# Patient Record
Sex: MALE | Race: WHITE | NOT HISPANIC OR LATINO | Employment: OTHER | URBAN - METROPOLITAN AREA
[De-identification: names, ages, dates, MRNs, and addresses within clinical notes are randomized per-mention and may not be internally consistent; named-entity substitution may affect disease eponyms.]

---

## 2019-10-18 NOTE — PATIENT INSTRUCTIONS
Preventive Health Recommendations  Male Ages 40 to 49    Yearly exam:             See your health care provider every year in order to  o   Review health changes.   o   Discuss preventive care.    o   Review your medicines if your doctor has prescribed any.    You should be tested each year for STDs (sexually transmitted diseases) if you re at risk.     Have a cholesterol test every 5 years.     Have a colonoscopy (test for colon cancer) if someone in your family has had colon cancer or polyps before age 50.     After age 45, have a diabetes test (fasting glucose). If you are at risk for diabetes, you should have this test every 3 years.      Talk with your health care provider about whether or not a prostate cancer screening test (PSA) is right for you.    Shots: Get a flu shot each year. Get a tetanus shot every 10 years.     Nutrition:    Eat at least 5 servings of fruits and vegetables daily.     Eat whole-grain bread, whole-wheat pasta and brown rice instead of white grains and rice.     Get adequate Calcium and Vitamin D.     Lifestyle    Exercise for at least 150 minutes a week (30 minutes a day, 5 days a week). This will help you control your weight and prevent disease.     Limit alcohol to one drink per day.     No smoking.     Wear sunscreen to prevent skin cancer.     See your dentist every six months for an exam and cleaning.    Given your elevated blood pressure, I recommend a medication called lisinopril 5 mg daily with breakfast.  If you have side effects, let me know.  Follow up every 2 weeks for a blood pressure check and will recheck labs in 1 month.    Will order a sleep study as I have a high suspicion for sleep apnea given your snoring and difficulty sleeping.  They will call to schedule.    Try melatonin 3 mg nightly for sleep.

## 2019-10-18 NOTE — PROGRESS NOTES
SUBJECTIVE:   CC: Alexey Underwood is an 43 year old male who presents for preventative health visit.     Healthy Habits:     Getting at least 3 servings of Calcium per day:  Yes    Bi-annual eye exam:  Yes    Dental care twice a year:  Yes    Sleep apnea or symptoms of sleep apnea:  Excessive snoring    Diet:  Regular (no restrictions)    Frequency of exercise:  None    Taking medications regularly:  Yes    Medication side effects:  None    PHQ-2 Total Score: 1    Additional concerns today:  No    He has a history of borderline elevated blood pressure over the years but over the past few months it has been quite high when checking it outside of clinic. He has cut out pop over the past 3 years to try to be more healthy but still eats large portion sizes and does not get routine exercise. He has been under more stress recently as his wife was recently diagnosed with lymphoma so she has been undergoing chemotherapy treatment. He also has been stressed at work as there are no roads being built near his place of employment so they may need to move their building. He has been having difficulty sleeping at night given all the stress and also snores heavily. His wife has never told him that he stops breathing but there is a family history of sleep apnea in his father.     He had an episode of testicular pain/swelling a few weeks ago but it only lasted 1 day and symptoms have not returned.    Today's PHQ-2 Score:   PHQ-2 ( 1999 [employer]) 10/21/2019   Q1: Little interest or pleasure in doing things 0   Q2: Feeling down, depressed or hopeless 1   PHQ-2 Score 1   Q1: Little interest or pleasure in doing things Not at all   Q2: Feeling down, depressed or hopeless Several days   PHQ-2 Score 1     Abuse: Current or Past(Physical, Sexual or Emotional)- No  Do you feel safe in your environment? Yes    Social History     Tobacco Use     Smoking status: Never Smoker     Smokeless tobacco: Never Used   Substance Use Topics     Alcohol  "use: Yes     Comment: 1-2 times/month     If you drink alcohol do you typically have >3 drinks per day or >7 drinks per week? No    Alcohol Use 10/21/2019   Prescreen: >3 drinks/day or >7 drinks/week? No   Prescreen: >3 drinks/day or >7 drinks/week? -       Last PSA: No results found for: PSA    Reviewed orders with patient. Reviewed health maintenance and updated orders accordingly - Yes    Reviewed and updated as needed this visit by clinical staff  Tobacco  Allergies  Meds  Problems  Med Hx  Surg Hx  Fam Hx  Soc Hx          Reviewed and updated as needed this visit by Provider  Tobacco  Allergies  Meds  Problems  Med Hx  Surg Hx  Fam Hx      Review of Systems   Constitutional: Negative for chills and fever.   HENT: Negative for congestion, ear pain, hearing loss and sore throat.    Eyes: Negative for pain and visual disturbance.   Respiratory: Negative for cough and shortness of breath.    Cardiovascular: Negative for chest pain, palpitations and peripheral edema.   Gastrointestinal: Negative for abdominal pain, constipation, diarrhea, heartburn, hematochezia and nausea.   Genitourinary: Negative for discharge, dysuria, frequency, genital sores, hematuria, impotence and urgency.   Musculoskeletal: Negative for arthralgias, joint swelling and myalgias.   Skin: Negative for rash.   Neurological: Negative for dizziness, weakness, headaches and paresthesias.   Psychiatric/Behavioral: Negative for mood changes. The patient is nervous/anxious.      OBJECTIVE:   BP (!) 156/98   Pulse 77   Temp 98.7  F (37.1  C) (Temporal)   Resp 18   Ht 1.727 m (5' 8\")   Wt 122.5 kg (270 lb)   SpO2 96%   BMI 41.05 kg/m      Physical Exam  GENERAL: healthy, alert and no distress  EYES: Eyes grossly normal to inspection, PERRL and conjunctivae and sclerae normal  HENT: ear canals and TM's normal, nose and mouth without ulcers or lesions  NECK: no adenopathy, no asymmetry, masses, or scars and thyroid normal to " palpation  RESP: lungs clear to auscultation - no rales, rhonchi or wheezes  CV: regular rate and rhythm, normal S1 S2, no S3 or S4, no murmur, click or rub, no peripheral edema and peripheral pulses strong  ABDOMEN: soft, nontender, no hepatosplenomegaly, no masses and bowel sounds normal   (male): normal male circumcised genitalia without lesions or urethral discharge, no hernia  MS: no gross musculoskeletal defects noted, no edema. FROM to all extremities. No spinal tenderness.   SKIN: no suspicious lesions or rashes  NEURO: Normal strength and tone, mentation intact and speech normal. Cranial nerves II-XII are grossly intact. DTRs are 2+/4 throughout and symmetric. Gait is stable.  PSYCH: mentation appears normal, affect normal/bright    ASSESSMENT/PLAN:       ICD-10-CM    1. Routine general medical examination at a health care facility Z00.00    2. Essential hypertension I10 Basic metabolic panel  (Ca, Cl, CO2, Creat, Gluc, K, Na, BUN)     TSH with free T4 reflex     lisinopril (PRINIVIL/ZESTRIL) 5 MG tablet     Basic metabolic panel  (Ca, Cl, CO2, Creat, Gluc, K, Na, BUN)     OFFICE/OUTPT VISIT,EST,LEVL III   3. Morbid obesity (H) E66.01 OFFICE/OUTPT VISIT,EST,LEVL III   4. Snoring R06.83 SLEEP EVALUATION & MANAGEMENT REFERRAL - [facility] [phone] (Age 13 and up if over 100 lbs)     OFFICE/OUTPT VISIT,EST,LEVL III   5. CARDIOVASCULAR SCREENING; LDL GOAL LESS THAN 160 Z13.6 Lipid panel reflex to direct LDL Fasting       2-3. He has a history of elevated blood pressure over the years and it has been elevated recently outside of the clinic per patient. Given these readings including today's readings which are quite high, he has hypertension so I recommend lisinopril 5 mg daily. I discussed common side effects and recommend nursing blood pressure checks every 2 weeks for the next month. Will check BMP and TSH today and will recheck BMP in 1 month. I also recommend a low salt,  "low carb diet with decreased portion sizes along with routine exercise for at least 30 minutes 3-5 days per week.     BP Readings from Last 6 Encounters:   10/21/19 (!) 156/98   12/05/14 (!) 148/98   01/08/14 130/70   12/19/13 128/89   10/10/13 138/88   10/28/11 136/78     4. Given his difficulty sleeping, obesity, and snoring, I have a high suspicion for sleep apnea so will order a sleep study for further evaluation. I recommend melatonin 3 mg nightly as needed.    5. Fasting lipid panel ordered.    Follow up annually.       COUNSELING:   Reviewed preventive health counseling, as reflected in patient instructions       Regular exercise       Healthy diet/nutrition    Estimated body mass index is 41.05 kg/m  as calculated from the following:    Height as of this encounter: 1.727 m (5' 8\").    Weight as of this encounter: 122.5 kg (270 lb).     Weight management plan: Discussed healthy diet and exercise guidelines     reports that he has never smoked. He has never used smokeless tobacco.      Counseling Resources:  ATP IV Guidelines  Pooled Cohorts Equation Calculator  FRAX Risk Assessment  ICSI Preventive Guidelines  Dietary Guidelines for Americans, 2010  USDA's MyPlate  ASA Prophylaxis  Lung CA Screening    Carlos Enrique Culver PA-C  [facility]  "

## 2019-10-21 ENCOUNTER — OFFICE VISIT (OUTPATIENT)
Dept: FAMILY MEDICINE | Facility: OTHER | Age: 44
End: 2019-10-21
Payer: COMMERCIAL

## 2019-10-21 VITALS
SYSTOLIC BLOOD PRESSURE: 148 MMHG | RESPIRATION RATE: 18 BRPM | WEIGHT: 270 LBS | DIASTOLIC BLOOD PRESSURE: 88 MMHG | BODY MASS INDEX: 40.92 KG/M2 | HEIGHT: 68 IN | TEMPERATURE: 98.7 F | HEART RATE: 77 BPM | OXYGEN SATURATION: 96 %

## 2019-10-21 DIAGNOSIS — Z00.00 ROUTINE GENERAL MEDICAL EXAMINATION AT A HEALTH CARE FACILITY: Primary | ICD-10-CM

## 2019-10-21 DIAGNOSIS — E66.01 MORBID OBESITY (H): ICD-10-CM

## 2019-10-21 DIAGNOSIS — Z13.6 CARDIOVASCULAR SCREENING; LDL GOAL LESS THAN 160: ICD-10-CM

## 2019-10-21 DIAGNOSIS — R06.83 SNORING: ICD-10-CM

## 2019-10-21 DIAGNOSIS — I10 ESSENTIAL HYPERTENSION: ICD-10-CM

## 2019-10-21 LAB
ANION GAP SERPL CALCULATED.3IONS-SCNC: 4 MMOL/L (ref 3–14)
BUN SERPL-MCNC: 15 MG/DL (ref 7–30)
CALCIUM SERPL-MCNC: 9.1 MG/DL (ref 8.5–10.1)
CHLORIDE SERPL-SCNC: 106 MMOL/L (ref 94–109)
CHOLEST SERPL-MCNC: 171 MG/DL
CO2 SERPL-SCNC: 28 MMOL/L (ref 20–32)
CREAT SERPL-MCNC: 0.81 MG/DL (ref 0.66–1.25)
GFR SERPL CREATININE-BSD FRML MDRD: >90 ML/MIN/{1.73_M2}
GLUCOSE SERPL-MCNC: 101 MG/DL (ref 70–99)
HDLC SERPL-MCNC: 48 MG/DL
LDLC SERPL CALC-MCNC: 105 MG/DL
NONHDLC SERPL-MCNC: 123 MG/DL
POTASSIUM SERPL-SCNC: 3.6 MMOL/L (ref 3.4–5.3)
SODIUM SERPL-SCNC: 138 MMOL/L (ref 133–144)
TRIGL SERPL-MCNC: 88 MG/DL
TSH SERPL DL<=0.005 MIU/L-ACNC: 1.22 MU/L (ref 0.4–4)

## 2019-10-21 PROCEDURE — 99213 OFFICE O/P EST LOW 20 MIN: CPT | Mod: 25 | Performed by: PHYSICIAN ASSISTANT

## 2019-10-21 PROCEDURE — 80061 LIPID PANEL: CPT | Performed by: PHYSICIAN ASSISTANT

## 2019-10-21 PROCEDURE — 99386 PREV VISIT NEW AGE 40-64: CPT | Performed by: PHYSICIAN ASSISTANT

## 2019-10-21 PROCEDURE — 80048 BASIC METABOLIC PNL TOTAL CA: CPT | Performed by: PHYSICIAN ASSISTANT

## 2019-10-21 PROCEDURE — 36415 COLL VENOUS BLD VENIPUNCTURE: CPT | Performed by: PHYSICIAN ASSISTANT

## 2019-10-21 PROCEDURE — 84443 ASSAY THYROID STIM HORMONE: CPT | Performed by: PHYSICIAN ASSISTANT

## 2019-10-21 RX ORDER — LISINOPRIL 5 MG/1
5 TABLET ORAL DAILY
Qty: 30 TABLET | Refills: 5 | Status: SHIPPED | OUTPATIENT
Start: 2019-10-21 | End: 2020-04-08

## 2019-10-21 ASSESSMENT — ENCOUNTER SYMPTOMS
EYE PAIN: 0
ABDOMINAL PAIN: 0
CONSTIPATION: 0
CHILLS: 0
DIARRHEA: 0
HEARTBURN: 0
HEMATOCHEZIA: 0
SORE THROAT: 0
PALPITATIONS: 0
NAUSEA: 0
WEAKNESS: 0
SHORTNESS OF BREATH: 0
COUGH: 0
HEADACHES: 0
MYALGIAS: 0
JOINT SWELLING: 0
PARESTHESIAS: 0
FEVER: 0
HEMATURIA: 0
FREQUENCY: 0
DIZZINESS: 0
ARTHRALGIAS: 0
DYSURIA: 0
NERVOUS/ANXIOUS: 1

## 2019-10-21 ASSESSMENT — MIFFLIN-ST. JEOR: SCORE: 2104.21

## 2019-11-11 ENCOUNTER — ALLIED HEALTH/NURSE VISIT (OUTPATIENT)
Dept: FAMILY MEDICINE | Facility: OTHER | Age: 44
End: 2019-11-11
Payer: COMMERCIAL

## 2019-11-11 VITALS — DIASTOLIC BLOOD PRESSURE: 91 MMHG | SYSTOLIC BLOOD PRESSURE: 139 MMHG | HEART RATE: 79 BPM

## 2019-11-11 DIAGNOSIS — Z01.30 BP CHECK: Primary | ICD-10-CM

## 2019-11-11 PROCEDURE — 99207 ZZC NO CHARGE NURSE ONLY: CPT

## 2019-11-11 NOTE — PROGRESS NOTES
Alexey Underwood is a 44 year old patient who comes in today for a Blood Pressure check.  Initial BP:  BP (!) 139/91   Pulse 79      79  Disposition: follow-up as previously indicated by provider and results routed to provider  Camilla Ballard CMA

## 2019-11-14 PROBLEM — E66.01 MORBID OBESITY (H): Chronic | Status: ACTIVE | Noted: 2019-10-21

## 2019-11-14 PROBLEM — I10 ESSENTIAL HYPERTENSION: Chronic | Status: ACTIVE | Noted: 2019-10-21

## 2019-11-17 ENCOUNTER — HOSPITAL ENCOUNTER (EMERGENCY)
Facility: CLINIC | Age: 44
Discharge: HOME | End: 2019-11-18
Attending: PHYSICIAN ASSISTANT | Admitting: PHYSICIAN ASSISTANT
Payer: COMMERCIAL

## 2019-11-17 DIAGNOSIS — R11.10 VOMITING AND DIARRHEA: ICD-10-CM

## 2019-11-17 DIAGNOSIS — R19.7 VOMITING AND DIARRHEA: ICD-10-CM

## 2019-11-17 LAB
ALBUMIN SERPL-MCNC: 3.7 G/DL (ref 3.4–5)
ALP SERPL-CCNC: 100 U/L (ref 40–150)
ALT SERPL W P-5'-P-CCNC: 88 U/L (ref 0–70)
ANION GAP SERPL CALCULATED.3IONS-SCNC: 9 MMOL/L (ref 3–14)
AST SERPL W P-5'-P-CCNC: 23 U/L (ref 0–45)
BASOPHILS # BLD AUTO: 0 10E9/L (ref 0–0.2)
BASOPHILS NFR BLD AUTO: 0.2 %
BILIRUB SERPL-MCNC: 0.6 MG/DL (ref 0.2–1.3)
BUN SERPL-MCNC: 27 MG/DL (ref 7–30)
CALCIUM SERPL-MCNC: 8.6 MG/DL (ref 8.5–10.1)
CHLORIDE SERPL-SCNC: 105 MMOL/L (ref 94–109)
CO2 SERPL-SCNC: 26 MMOL/L (ref 20–32)
CREAT SERPL-MCNC: 0.82 MG/DL (ref 0.66–1.25)
DIFFERENTIAL METHOD BLD: ABNORMAL
EOSINOPHIL NFR BLD AUTO: 0 %
ERYTHROCYTE [DISTWIDTH] IN BLOOD BY AUTOMATED COUNT: 14.3 % (ref 10–15)
GFR SERPL CREATININE-BSD FRML MDRD: >90 ML/MIN/{1.73_M2}
GLUCOSE SERPL-MCNC: 131 MG/DL (ref 70–99)
HCT VFR BLD AUTO: 50.2 % (ref 40–53)
HGB BLD-MCNC: 16.6 G/DL (ref 13.3–17.7)
IMM GRANULOCYTES # BLD: 0 10E9/L (ref 0–0.4)
IMM GRANULOCYTES NFR BLD: 0.4 %
LIPASE SERPL-CCNC: 35 U/L (ref 73–393)
LYMPHOCYTES # BLD AUTO: 0.4 10E9/L (ref 0.8–5.3)
LYMPHOCYTES NFR BLD AUTO: 4.3 %
MCH RBC QN AUTO: 28.9 PG (ref 26.5–33)
MCHC RBC AUTO-ENTMCNC: 33.1 G/DL (ref 31.5–36.5)
MCV RBC AUTO: 88 FL (ref 78–100)
MONOCYTES # BLD AUTO: 0.5 10E9/L (ref 0–1.3)
MONOCYTES NFR BLD AUTO: 4.4 %
NEUTROPHILS # BLD AUTO: 9.2 10E9/L (ref 1.6–8.3)
NEUTROPHILS NFR BLD AUTO: 90.7 %
NRBC # BLD AUTO: 0 10*3/UL
NRBC BLD AUTO-RTO: 0 /100
PLATELET # BLD AUTO: 252 10E9/L (ref 150–450)
POTASSIUM SERPL-SCNC: 3.9 MMOL/L (ref 3.4–5.3)
PROT SERPL-MCNC: 8.1 G/DL (ref 6.8–8.8)
RBC # BLD AUTO: 5.74 10E12/L (ref 4.4–5.9)
SODIUM SERPL-SCNC: 140 MMOL/L (ref 133–144)
TROPONIN I SERPL-MCNC: <0.015 UG/L (ref 0–0.04)
WBC # BLD AUTO: 10.2 10E9/L (ref 4–11)

## 2019-11-17 PROCEDURE — 25000128 H RX IP 250 OP 636: Performed by: PHYSICIAN ASSISTANT

## 2019-11-17 PROCEDURE — 96365 THER/PROPH/DIAG IV INF INIT: CPT | Performed by: PHYSICIAN ASSISTANT

## 2019-11-17 PROCEDURE — 85025 COMPLETE CBC W/AUTO DIFF WBC: CPT | Performed by: PHYSICIAN ASSISTANT

## 2019-11-17 PROCEDURE — 25800030 ZZH RX IP 258 OP 636: Performed by: PHYSICIAN ASSISTANT

## 2019-11-17 PROCEDURE — 96374 THER/PROPH/DIAG INJ IV PUSH: CPT | Performed by: PHYSICIAN ASSISTANT

## 2019-11-17 PROCEDURE — 84484 ASSAY OF TROPONIN QUANT: CPT | Performed by: PHYSICIAN ASSISTANT

## 2019-11-17 PROCEDURE — 93005 ELECTROCARDIOGRAM TRACING: CPT | Performed by: PHYSICIAN ASSISTANT

## 2019-11-17 PROCEDURE — 99284 EMERGENCY DEPT VISIT MOD MDM: CPT | Mod: 25 | Performed by: PHYSICIAN ASSISTANT

## 2019-11-17 PROCEDURE — 83690 ASSAY OF LIPASE: CPT | Performed by: PHYSICIAN ASSISTANT

## 2019-11-17 PROCEDURE — 80053 COMPREHEN METABOLIC PANEL: CPT | Performed by: PHYSICIAN ASSISTANT

## 2019-11-17 PROCEDURE — 99284 EMERGENCY DEPT VISIT MOD MDM: CPT | Mod: 26 | Performed by: PHYSICIAN ASSISTANT

## 2019-11-17 RX ORDER — MELATONIN 10 MG
CAPSULE ORAL PRN
COMMUNITY
End: 2024-02-27

## 2019-11-17 RX ORDER — HYDROMORPHONE HYDROCHLORIDE 1 MG/ML
0.5 INJECTION, SOLUTION INTRAMUSCULAR; INTRAVENOUS; SUBCUTANEOUS
Status: DISCONTINUED | OUTPATIENT
Start: 2019-11-17 | End: 2019-11-18 | Stop reason: HOSPADM

## 2019-11-17 RX ORDER — SODIUM CHLORIDE 9 MG/ML
INJECTION, SOLUTION INTRAVENOUS CONTINUOUS
Status: DISCONTINUED | OUTPATIENT
Start: 2019-11-17 | End: 2019-11-18 | Stop reason: HOSPADM

## 2019-11-17 RX ORDER — ONDANSETRON 2 MG/ML
4 INJECTION INTRAMUSCULAR; INTRAVENOUS EVERY 30 MIN PRN
Status: DISCONTINUED | OUTPATIENT
Start: 2019-11-17 | End: 2019-11-18 | Stop reason: HOSPADM

## 2019-11-17 RX ADMIN — ONDANSETRON 4 MG: 2 INJECTION INTRAMUSCULAR; INTRAVENOUS at 22:08

## 2019-11-17 RX ADMIN — SODIUM CHLORIDE 1000 ML: 9 INJECTION, SOLUTION INTRAVENOUS at 21:59

## 2019-11-17 RX ADMIN — SODIUM CHLORIDE 1000 ML: 9 INJECTION, SOLUTION INTRAVENOUS at 22:49

## 2019-11-17 NOTE — ED AVS SNAPSHOT
[facility] Emergency Department  [address] [place] [address]  Phone:  [phone]  Fax:  [phone]                                    Alexey Underwood   MRN: [id number]    Department:  [facility] Emergency Department   Date of Visit:  11/17/2019           After Visit Summary Signature Page    I have received my discharge instructions, and my questions have been answered. I have discussed any challenges I see with this plan with the nurse or doctor.    ..........................................................................................................................................  Patient/Patient Representative Signature      ..........................................................................................................................................  Patient Representative Print Name and Relationship to Patient    ..................................................               ................................................  Date                                   Time    ..........................................................................................................................................  Reviewed by Signature/Title    ...................................................              ..............................................  Date                                               Time          22EPIC Rev 08/18

## 2019-11-18 VITALS
TEMPERATURE: 97.8 F | RESPIRATION RATE: 20 BRPM | OXYGEN SATURATION: 93 % | SYSTOLIC BLOOD PRESSURE: 119 MMHG | DIASTOLIC BLOOD PRESSURE: 60 MMHG | BODY MASS INDEX: 41.05 KG/M2 | HEART RATE: 101 BPM | WEIGHT: 270 LBS

## 2019-11-18 RX ORDER — ONDANSETRON 4 MG/1
4 TABLET, ORALLY DISINTEGRATING ORAL EVERY 8 HOURS PRN
Qty: 10 TABLET | Refills: 0 | Status: SHIPPED | OUTPATIENT
Start: 2019-11-18 | End: 2020-05-05

## 2019-11-18 NOTE — ED TRIAGE NOTES
"Presents to ED with nausea, vomiting, and diarrhea since this morning. States, \"I ate some sausage that may have been bad in my dad's fridge.\"  Patient reports he does have a history of diverticulitis but \"is well controlled due to the fiber supplements.\" States ate breakfast at 0530, went to the deer stand and started vomiting at 0730 and has been sick since. Also complains of mid-sternal chest pain that he describes as a \"heartburn\" and 6/10 pain that started after he became ill and was walking back from his deer stand.  "

## 2019-11-18 NOTE — DISCHARGE INSTRUCTIONS
It was a pleasure working with you today!  I hope your condition improves rapidly!    Please rest your stomach.  Stay on mostly a clear liquid diet until tomorrow afternoon.  Please see the handout on this.  Once her stomach is feeling much better, then you could try advancing your diet to the BRAT diet (banana, rice, applesauce, toast)    Make sure you are drinking a lot of water and/or Gatorade to maintain adequate hydration.  It is okay to use Zofran as needed for nausea and vomiting.    Please do not hesitate to return if your symptoms are worsening in any way or if you develop black or bloody stools like we discussed.

## 2019-11-18 NOTE — ED PROVIDER NOTES
History     Chief Complaint   Patient presents with     Nausea, Vomiting, & Diarrhea     The history is provided by the patient.     Alexey Underwood is a 44 year old male who is presenting to the emergency department with complaints of nausea, vomiting, and diarrhea. The patient states that this morning he woke up around 0530 to go hunting. He ate breakfast and then walked quite a ways to his deer stand where he climbed in and says he was able to get everything, like the heater, running. Around 0730 he started to vomit multiple times and had to go to the bathroom, so he headed back inside. When he came back in he had severe diarrhea and ended up vomiting multiple more times. The patient decided to head home and notes he had to stop multiple times on the way home to vomit as well as go to the bathroom. He is unable to keep anything down and says that within about four minutes of eating or drinking anything it comes back up or causes him to have diarrhea. He got home and took a shower which he says felt good. The patient has some abdominal pain that he rates a 6/10, but says it goes down to about a 4/10 when laying down. He notes that laying on his sides makes him nauseous, but laying flat he is fine. He has been vomiting acid now since he has not been able to keep anything down and notes his diarrhea is now darker, almost black, and has a foul smell. 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 He has no rashes or fever, but has had the chills and sweats. He denies being on any antibiotics recently. He says he drank some Gatorade about an hour ago. The patient has Diverticulitis and will occasionally get flare ups, but has not for awhile. He claims that this pain and symptoms does not feel similar to those flare up episodes. He has never had a GI bleed.     Allergies:  Allergies   Allergen Reactions     No Known Allergies        Problem List:    Patient  "Active Problem List    Diagnosis Date Noted     Morbid obesity (H) 10/21/2019     Priority: Medium     Essential hypertension 10/21/2019     Priority: Medium     Renal colic 12/19/2013     Priority: Medium     Left       Diverticulosis of colon- has had a few flares of \"-itis\" 10/28/2011     Priority: Medium     CARDIOVASCULAR SCREENING; LDL GOAL LESS THAN 160 10/31/2010     Priority: Medium        Past Medical History:    Past Medical History:   Diagnosis Date     Diverticulosis        Past Surgical History:    Past Surgical History:   Procedure Laterality Date     NO HISTORY OF SURGERY         Family History:    Family History   Problem Relation Age of Onset     Hypertension Father      Diabetes Maternal Grandmother      Diabetes Maternal Grandfather      Asthma No family hx of      C.A.D. No family hx of      Cerebrovascular Disease No family hx of      Breast Cancer No family hx of      Cancer - colorectal No family hx of      Prostate Cancer No family hx of      Alcohol/Drug No family hx of      Allergies No family hx of      Alzheimer Disease No family hx of      Anesthesia Reaction No family hx of      Arthritis No family hx of      Blood Disease No family hx of      Cancer No family hx of      Cardiovascular No family hx of      Circulatory No family hx of      Connective Tissue Disorder No family hx of      Congenital Anomalies No family hx of      Depression No family hx of      Eye Disorder No family hx of      Genetic Disorder No family hx of      Gastrointestinal Disease No family hx of      Genitourinary Problems No family hx of      Gynecology No family hx of      Heart Disease No family hx of      Lipids No family hx of      Musculoskeletal Disorder No family hx of      Neurologic Disorder No family hx of      Obesity No family hx of      Osteoporosis No family hx of      Psychotic Disorder No family hx of      Respiratory No family hx of      Thyroid Disease No family hx of      Hearing Loss No " family hx of        Social History:  Marital Status:   [2]  Social History     Tobacco Use     Smoking status: Never Smoker     Smokeless tobacco: Never Used   Substance Use Topics     Alcohol use: Yes     Comment: 1-2 times/month     Drug use: No        Medications:    FIBER PO  lisinopril (PRINIVIL/ZESTRIL) 5 MG tablet  Melatonin 10 MG CAPS  ondansetron (ZOFRAN ODT) 4 MG ODT tab          Review of Systems   All other systems reviewed and are negative.      Physical Exam   BP: (!) 148/95  Pulse: 113  Heart Rate: 98  Temp: 98.2  F (36.8  C)  Resp: 18  Weight: 122.5 kg (270 lb)  SpO2: 93 %      Physical Exam  Vitals signs and nursing note reviewed.   Constitutional:       General: He is not in acute distress.     Appearance: Normal appearance. He is not diaphoretic.   HENT:      Head: Normocephalic and atraumatic.      Right Ear: Tympanic membrane and external ear normal.      Left Ear: Tympanic membrane and external ear normal.      Nose: Nose normal. No congestion or rhinorrhea.      Mouth/Throat:      Mouth: Mucous membranes are dry.      Pharynx: Oropharynx is clear. No oropharyngeal exudate or posterior oropharyngeal erythema.   Eyes:      General: No scleral icterus.     Extraocular Movements: Extraocular movements intact.      Conjunctiva/sclera: Conjunctivae normal.      Pupils: Pupils are equal, round, and reactive to light.   Neck:      Musculoskeletal: Normal range of motion and neck supple. No neck rigidity or muscular tenderness.   Cardiovascular:      Rate and Rhythm: Normal rate and regular rhythm.      Pulses: Normal pulses.      Heart sounds: Normal heart sounds. No murmur.   Pulmonary:      Effort: Pulmonary effort is normal. No respiratory distress.      Breath sounds: Normal breath sounds. No wheezing.   Chest:      Chest wall: No tenderness.   Abdominal:      General: Bowel sounds are normal.      Palpations: Abdomen is soft.      Tenderness: There is abdominal tenderness in the epigastric  area.      Comments: Mild tenderness.    Musculoskeletal: Normal range of motion.         General: No tenderness, deformity or signs of injury.   Skin:     General: Skin is warm and dry.      Capillary Refill: Capillary refill takes less than 2 seconds.      Coloration: Skin is not pale.      Findings: No rash.   Neurological:      General: No focal deficit present.      Mental Status: He is alert and oriented to person, place, and time.      Cranial Nerves: No cranial nerve deficit.      Sensory: No sensory deficit.   Psychiatric:         Thought Content: Thought content normal.         Judgment: Judgment normal.         ED Course        Procedures               Critical Care time:  none               Results for orders placed or performed during the hospital encounter of 11/17/19 (from the past 24 hour(s))   CBC with platelets differential   Result Value Ref Range    WBC 10.2 4.0 - 11.0 10e9/L    RBC Count 5.74 4.4 - 5.9 10e12/L    Hemoglobin 16.6 13.3 - 17.7 g/dL    Hematocrit 50.2 40.0 - 53.0 %    MCV 88 78 - 100 fl    MCH 28.9 26.5 - 33.0 pg    MCHC 33.1 31.5 - 36.5 g/dL    RDW 14.3 10.0 - 15.0 %    Platelet Count 252 150 - 450 10e9/L    Diff Method Automated Method     % Neutrophils 90.7 %    % Lymphocytes 4.3 %    % Monocytes 4.4 %    % Eosinophils 0.0 %    % Basophils 0.2 %    % Immature Granulocytes 0.4 %    Nucleated RBCs 0 0 /100    Absolute Neutrophil 9.2 (H) 1.6 - 8.3 10e9/L    Absolute Lymphocytes 0.4 (L) 0.8 - 5.3 10e9/L    Absolute Monocytes 0.5 0.0 - 1.3 10e9/L    Absolute Basophils 0.0 0.0 - 0.2 10e9/L    Abs Immature Granulocytes 0.0 0 - 0.4 10e9/L    Absolute Nucleated RBC 0.0    Comprehensive metabolic panel   Result Value Ref Range    Sodium 140 133 - 144 mmol/L    Potassium 3.9 3.4 - 5.3 mmol/L    Chloride 105 94 - 109 mmol/L    Carbon Dioxide 26 20 - 32 mmol/L    Anion Gap 9 3 - 14 mmol/L    Glucose 131 (H) 70 - 99 mg/dL    Urea Nitrogen 27 7 - 30 mg/dL    Creatinine 0.82 0.66 - 1.25 mg/dL     GFR Estimate >90 >60 mL/min/[1.73_m2]    GFR Estimate If Black >90 >60 mL/min/[1.73_m2]    Calcium 8.6 8.5 - 10.1 mg/dL    Bilirubin Total 0.6 0.2 - 1.3 mg/dL    Albumin 3.7 3.4 - 5.0 g/dL    Protein Total 8.1 6.8 - 8.8 g/dL    Alkaline Phosphatase 100 40 - 150 U/L    ALT 88 (H) 0 - 70 U/L    AST 23 0 - 45 U/L   Lipase   Result Value Ref Range    Lipase 35 (L) 73 - 393 U/L   Troponin I   Result Value Ref Range    Troponin I ES <0.015 0.000 - 0.045 ug/L       Medications   0.9% sodium chloride BOLUS (0 mLs Intravenous Stopped 11/17/19 2248)     Followed by   0.9% sodium chloride BOLUS (0 mLs Intravenous Stopped 11/18/19 0011)     Followed by   sodium chloride 0.9% infusion (has no administration in time range)   ondansetron (ZOFRAN) injection 4 mg (4 mg Intravenous Given 11/17/19 2208)   HYDROmorphone (PF) (DILAUDID) injection 0.5 mg (has no administration in time range)       Assessments & Plan (with Medical Decision Making)  Vomiting and diarrhea     44 year old male presents for evaluation of acute onset nausea, vomiting, and diarrhea starting this morning at 7:30 AM.  Has not been able to tolerate anything p.o. since then.  Has immediate onset diarrhea within 4 minutes of eating or drinking anything.  No recent travel.  No other ill family members.  He is not sure if he ate something bad this morning with breakfast.  Denies any fevers or chills.  Has generalized abdominal discomfort, but no localized discomfort.  He has more discomfort in the epigastric area.  Denies any real chest pain.  No dyspnea.  No lower extremity edema.  On exam blood pressure 102/48, pulse 104, temperature 98.2, and oxygen saturation 92% on room air.  Morbidly obese male in no acute distress.  Very dry oral mucous membranes.  Generalized abdominal discomfort but no localized discomfort.  No rebound or guarding.  No hepatosplenomegaly.  On further questioning, the patient denies any prior history of GI bleeding.  No GI issues such as GERD,  gastritis, or other colonic abnormalities.  He states that his diarrhea was darker in color, but does not think that it was black.  I did want to perform a stool occult on his diarrhea given his frequent stools, but the patient did not go any more while he was in the ED.  I recommended a rectal exam, but he declined.  He was not concerned about GI bleeding.  He did not think that his stool looked black, and he did confirm that there was not any blood in his stool.  He was given IV normal saline 2 L for rehydration purposes.  IV Zofran provided for nausea.  He had no further vomiting here in the ED.  He states that his abdominal cramping discomfort completely resolved with these simple interventions as well as a dose of IV Dilaudid.  Patient drank 12 ounces of water here in the ED and tolerated this very well.  Given acute onset of symptoms this morning and nonhemorrhagic diarrhea, I am not planning to perform stool cultures currently.  Patient is aware that he should return in the event of symptoms worsening or changing.  He would like to return home if at all possible.  We discussed a clear liquid diet with him in detail and progress slowly with the BRAT diet tomorrow afternoon if he can tolerate it.  I did send him home with Zofran to use for as needed nausea and vomiting.  Patient likely has a case of viral enteritis.  He was in agreement with this plan and was suitable for discharge.  His daughter was present to drive him home.     I have reviewed the nursing notes.    I have reviewed the findings, diagnosis, plan and need for follow up with the patient.       New Prescriptions    ONDANSETRON (ZOFRAN ODT) 4 MG ODT TAB    Take 1 tablet (4 mg) by mouth every 8 hours as needed for nausea or vomiting       Final diagnoses:   Vomiting and diarrhea               This document serves as a record of services personally performed by Jameson Baker PA*. It was created on their behalf by Geneva Hansen, a trained  medical scribe. The creation of this record is based on the provider's personal observations and the statements of the patient. This document has been checked and approved by the attending provider.  Note: Chart documentation done in part with Dragon Voice Recognition software. Although reviewed after completion, some word and grammatical errors may remain.  11/17/2019   Jameson Baker PA-C   [facility] EMERGENCY DEPARTMENT     Jameson Baker PA-C  11/18/19 0037

## 2020-04-08 DIAGNOSIS — I10 ESSENTIAL HYPERTENSION: ICD-10-CM

## 2020-04-08 RX ORDER — LISINOPRIL 5 MG/1
5 TABLET ORAL DAILY
Qty: 90 TABLET | Refills: 1 | Status: SHIPPED | OUTPATIENT
Start: 2020-04-08 | End: 2020-10-06

## 2020-04-08 NOTE — TELEPHONE ENCOUNTER
Prescription approved per [facility] Refill Protocol.    Sangita Mcknight, RN, BSN

## 2020-04-29 NOTE — PROGRESS NOTES
"Alexey Underwood is a 44 year old adult who is being evaluated via a billable telephone visit.      The patient has been notified of following:     \"This telephone visit will be conducted via a call between you and your physician/provider. We have found that certain health care needs can be provided without the need for a physical exam.  This service lets us provide the care you need with a short phone conversation.  If a prescription is necessary we can send it directly to your pharmacy.  If lab work is needed we can place an order for that and you can then stop by our lab to have the test done at a later time.    Telephone visits are billed at different rates depending on your insurance coverage. During this emergency period, for some insurers they may be billed the same as an in-person visit.  Please reach out to your insurance provider with any questions.    If during the course of the call the physician/provider feels a telephone visit is not appropriate, you will not be charged for this service.\"    Patient has given verbal consent for Telephone visit?  Yes    How would you like to obtain your AVS? Alejandra  Patient presents for evaluation of loud snoring and possible witnessed apneas.  Mostly his snoring is been quite loud for many years.  Patient's wife is concerned as well as his family since there is strong family history of obstructive sleep apnea.  His father has AILEEN.  Patient denies gasping or choking but does produce unusual sound sometimes like he is wheezing according to his wife.  He usually goes to sleep around 1030 11:00 at night and falls asleep almost instantly in less than 5 minutes.  He usually wakes up maybe once a night to go to the bathroom quickly and falls asleep in 5 to 10 minutes.  Usually wakes up around 7 AM.  The weekends he keeps the same schedule.  He rarely requires alarm in the morning to get up.  He denies any history of nasal obstruction.  Denies any weakness parasomnias no " restless legs symptomatology.  He does prefer to sleep on his stomach and occasionally sides.  He does more at night.  As far sleep hygiene he does watch TV in bed which does not affect his ability to fall asleep.  He works during the day does not do shift work.  In regard to his excessive daytime sleepiness his [place] is only 6 which is not consistent with excessive daytime sleepiness.  In regard to his caffeine consumption he used to drink a lot of soda but now drinks occasionally iced in the morning before noon.  No other caffeine after that.  She has history of hypertension that is well-controlled with medications.  He is 5 foot 9 inches with 20 to 30 pounds weight therefore is morbidly obese.  He denies feeling sleepy or drowsy when he drives long distances.  Impression: Patient with snoring likely observed apneas family history of sleep apnea hypertension.  Certainly further evaluation is necessary for sleep disordered breathing.  We will therefore order comprehensive blood sonography to further evaluate.  Patient will be followed afterwards.  Phone call duration: 25 minutes    Antonio Her MD

## 2020-05-05 ENCOUNTER — VIRTUAL VISIT (OUTPATIENT)
Dept: SLEEP MEDICINE | Facility: CLINIC | Age: 45
End: 2020-05-05
Attending: PHYSICIAN ASSISTANT
Payer: COMMERCIAL

## 2020-05-05 DIAGNOSIS — R06.83 SNORING: ICD-10-CM

## 2020-05-05 DIAGNOSIS — R06.81 APNEA: Primary | ICD-10-CM

## 2020-05-05 PROCEDURE — 99203 OFFICE O/P NEW LOW 30 MIN: CPT | Mod: 95 | Performed by: OTOLARYNGOLOGY

## 2020-10-06 DIAGNOSIS — I10 ESSENTIAL HYPERTENSION: ICD-10-CM

## 2020-10-06 RX ORDER — LISINOPRIL 5 MG/1
TABLET ORAL
Qty: 90 TABLET | Refills: 0 | Status: SHIPPED | OUTPATIENT
Start: 2020-10-06 | End: 2020-10-14

## 2020-10-07 NOTE — TELEPHONE ENCOUNTER
Pending Prescriptions:                       Disp   Refills    lisinopril (ZESTRIL) 5 MG tablet [Pharmac*90 tab*0            Sig: TAKE 1 TABLET(5 MG) BY MOUTH DAILY    Medication is being filled for 1 time júnior refill only due to:  Patient is due for med check    Please call and help schedule.  Thank you!

## 2020-10-14 ENCOUNTER — OFFICE VISIT (OUTPATIENT)
Dept: FAMILY MEDICINE | Facility: OTHER | Age: 45
End: 2020-10-14
Payer: COMMERCIAL

## 2020-10-14 VITALS
TEMPERATURE: 97.6 F | OXYGEN SATURATION: 97 % | HEIGHT: 68 IN | BODY MASS INDEX: 42.51 KG/M2 | RESPIRATION RATE: 16 BRPM | SYSTOLIC BLOOD PRESSURE: 142 MMHG | WEIGHT: 280.5 LBS | DIASTOLIC BLOOD PRESSURE: 102 MMHG | HEART RATE: 74 BPM

## 2020-10-14 DIAGNOSIS — R06.83 SNORING: ICD-10-CM

## 2020-10-14 DIAGNOSIS — E66.01 MORBID OBESITY (H): ICD-10-CM

## 2020-10-14 DIAGNOSIS — Z23 NEED FOR PROPHYLACTIC VACCINATION AND INOCULATION AGAINST INFLUENZA: ICD-10-CM

## 2020-10-14 DIAGNOSIS — I10 ESSENTIAL HYPERTENSION: Primary | ICD-10-CM

## 2020-10-14 DIAGNOSIS — Z11.4 SCREENING FOR HIV (HUMAN IMMUNODEFICIENCY VIRUS): ICD-10-CM

## 2020-10-14 DIAGNOSIS — Z13.6 CARDIOVASCULAR SCREENING; LDL GOAL LESS THAN 160: ICD-10-CM

## 2020-10-14 PROCEDURE — 99214 OFFICE O/P EST MOD 30 MIN: CPT | Performed by: PHYSICIAN ASSISTANT

## 2020-10-14 RX ORDER — LISINOPRIL 10 MG/1
10 TABLET ORAL DAILY
Qty: 90 TABLET | Refills: 3 | Status: SHIPPED | OUTPATIENT
Start: 2020-10-14 | End: 2021-11-23

## 2020-10-14 ASSESSMENT — MIFFLIN-ST. JEOR: SCORE: 2139.22

## 2020-10-14 NOTE — PROGRESS NOTES
Subjective     Alexey Underwood is a 44 year old adult who presents to clinic today for the following health issues:    History of Present Illness       Hypertension: He presents for follow up of hypertension.  He does not check blood pressure  regularly outside of the clinic. Outside blood pressures have been over 140/90. He does not follow a low salt diet.     He eats 0-1 servings of fruits and vegetables daily.He consumes 1 sweetened beverage(s) daily.He exercises with enough effort to increase his heart rate 10 to 19 minutes per day.  He exercises with enough effort to increase his heart rate 7 days per week.   He is taking medications regularly.         He has not had the sleep study yet.  He did have a virtual visit with  From a [facility].  He states that he filled out all the information to have it done. But then COVID hit and he didn't hear anything.  He would like to schedule it now. Patient states that he does think he has sleep issues. He is not sleeping as much/or as well as he thinks he could. Last night he went to bed at 7 and he feels much better after a long night sleep.    He admits he does not check his blood pressure at home.  Also, his wife works nights, he admits he is not very good at preparing healthy meals.  A lot of fast food and eating on the go.  He believes he probably eats large portions as well.  He does not have an exercise routine per se but is active and walks around at work as well as in the evenings when he gets home.  He states he is much more physically active once he gets home than he is even at work.            Review of Systems   CONSTITUTIONAL: NEGATIVE for fever, chills, change in weight  ENT/MOUTH: NEGATIVE for ear, mouth and throat problems  RESP: NEGATIVE for significant cough or SOB  CV: NEGATIVE for chest pain, palpitations or peripheral edema  GI: NEGATIVE for nausea, abdominal pain, heartburn, or change in bowel habits  MUSCULOSKELETAL: NEGATIVE for significant  "arthralgias or myalgia  PSYCHIATRIC: NEGATIVE for changes in mood or affect      Objective    BP (!) 142/102   Pulse 74   Temp 97.6  F (36.4  C) (Temporal)   Resp 16   Ht 1.715 m (5' 7.52\")   Wt 127.2 kg (280 lb 8 oz)   SpO2 97%   BMI 43.26 kg/m    Body mass index is 43.26 kg/m .  Physical Exam   GENERAL: healthy, alert and no distress  NECK: no adenopathy, no asymmetry, masses, or scars and thyroid normal to palpation  NECK: He has a very large, thick neck  RESP: lungs clear to auscultation - no rales, rhonchi or wheezes  CV: regular rate and rhythm, normal S1 S2, no S3 or S4, no murmur, click or rub, no peripheral edema and peripheral pulses strong  ABDOMEN: soft, nontender, no hepatosplenomegaly, no masses and bowel sounds normal  MS: no gross musculoskeletal defects noted, no edema  PSYCH: mentation appears normal, affect normal/bright    Admission on 11/17/2019, Discharged on 11/18/2019   Component Date Value Ref Range Status     WBC 11/17/2019 10.2  4.0 - 11.0 10e9/L Final     RBC Count 11/17/2019 5.74  4.4 - 5.9 10e12/L Final     Hemoglobin 11/17/2019 16.6  13.3 - 17.7 g/dL Final     Hematocrit 11/17/2019 50.2  40.0 - 53.0 % Final     MCV 11/17/2019 88  78 - 100 fl Final     MCH 11/17/2019 28.9  26.5 - 33.0 pg Final     MCHC 11/17/2019 33.1  31.5 - 36.5 g/dL Final     RDW 11/17/2019 14.3  10.0 - 15.0 % Final     Platelet Count 11/17/2019 252  150 - 450 10e9/L Final     Diff Method 11/17/2019 Automated Method   Final     % Neutrophils 11/17/2019 90.7  % Final     % Lymphocytes 11/17/2019 4.3  % Final     % Monocytes 11/17/2019 4.4  % Final     % Eosinophils 11/17/2019 0.0  % Final     % Basophils 11/17/2019 0.2  % Final     % Immature Granulocytes 11/17/2019 0.4  % Final     Nucleated RBCs 11/17/2019 0  0 /100 Final     Absolute Neutrophil 11/17/2019 9.2* 1.6 - 8.3 10e9/L Final     Absolute Lymphocytes 11/17/2019 0.4* 0.8 - 5.3 10e9/L Final     Absolute Monocytes 11/17/2019 0.5  0.0 - 1.3 10e9/L Final "     Absolute Basophils 11/17/2019 0.0  0.0 - 0.2 10e9/L Final     Abs Immature Granulocytes 11/17/2019 0.0  0 - 0.4 10e9/L Final     Absolute Nucleated RBC 11/17/2019 0.0   Final     Sodium 11/17/2019 140  133 - 144 mmol/L Final     Potassium 11/17/2019 3.9  3.4 - 5.3 mmol/L Final     Chloride 11/17/2019 105  94 - 109 mmol/L Final     Carbon Dioxide 11/17/2019 26  20 - 32 mmol/L Final     Anion Gap 11/17/2019 9  3 - 14 mmol/L Final     Glucose 11/17/2019 131* 70 - 99 mg/dL Final     Urea Nitrogen 11/17/2019 27  7 - 30 mg/dL Final     Creatinine 11/17/2019 0.82  0.66 - 1.25 mg/dL Final     GFR Estimate 11/17/2019 >90  >60 mL/min/[1.73_m2] Final    Comment: Non  GFR Calc  Starting 12/18/2018, serum creatinine based estimated GFR (eGFR) will be   calculated using the Chronic Kidney Disease Epidemiology Collaboration   (CKD-EPI) equation.       GFR Estimate If Black 11/17/2019 >90  >60 mL/min/[1.73_m2] Final    Comment:  GFR Calc  Starting 12/18/2018, serum creatinine based estimated GFR (eGFR) will be   calculated using the Chronic Kidney Disease Epidemiology Collaboration   (CKD-EPI) equation.       Calcium 11/17/2019 8.6  8.5 - 10.1 mg/dL Final     Bilirubin Total 11/17/2019 0.6  0.2 - 1.3 mg/dL Final     Albumin 11/17/2019 3.7  3.4 - 5.0 g/dL Final     Protein Total 11/17/2019 8.1  6.8 - 8.8 g/dL Final     Alkaline Phosphatase 11/17/2019 100  40 - 150 U/L Final     ALT 11/17/2019 88* 0 - 70 U/L Final     AST 11/17/2019 23  0 - 45 U/L Final     Lipase 11/17/2019 35* 73 - 393 U/L Final     Troponin I ES 11/17/2019 <0.015  0.000 - 0.045 ug/L Final    Comment: The 99th percentile for upper reference range is 0.045 ug/L.  Troponin values   in the range of 0.045 - 0.120 ug/L may be associated with risks of adverse   clinical events.               Assessment & Plan   He is not fasting today, in 2 weeks he will return to clinic for fasting labs and float nurse blood pressure recheck after our  "increase dosage of lisinopril  Essential hypertension  Increase dosage to 10 mg, previously it was 5.  If not 140/90 or lower at next visit would further increase to 20 mg.  Patient is aware if he does have untreated sleep apnea this may prevent us from controlling his blood pressure well until this is treated.  - lisinopril (ZESTRIL) 10 MG tablet; Take 1 tablet (10 mg) by mouth daily  - **Comprehensive metabolic panel FUTURE anytime; Future    Morbid obesity (H)  Discussed dietary improvements and exercise.  Patient understands losing weight will improve his health overall including blood pressure control and potential sleep apnea    Snoring  He asked for the number to call to schedule his sleep study, the staff will help him coordinate this    Need for prophylactic vaccination and inoculation against influenza  Patient declined    Screening for HIV (human immunodeficiency virus)  Patient declined    CARDIOVASCULAR SCREENING; LDL GOAL LESS THAN 160  pending  - Lipid panel reflex to direct LDL Fasting; Future     BMI:   Estimated body mass index is 43.26 kg/m  as calculated from the following:    Height as of this encounter: 1.715 m (5' 7.52\").    Weight as of this encounter: 127.2 kg (280 lb 8 oz).   Weight management plan: Discussed healthy diet and exercise guidelines         Recheck in 1 year once at goal    Return in about 2 weeks (around 10/28/2020) for BP Recheck, Lab Work.    Es Mccoy PA-C  [facility]    "

## 2020-10-29 ENCOUNTER — ALLIED HEALTH/NURSE VISIT (OUTPATIENT)
Dept: FAMILY MEDICINE | Facility: OTHER | Age: 45
End: 2020-10-29
Payer: COMMERCIAL

## 2020-10-29 VITALS — SYSTOLIC BLOOD PRESSURE: 120 MMHG | HEART RATE: 64 BPM | DIASTOLIC BLOOD PRESSURE: 68 MMHG

## 2020-10-29 DIAGNOSIS — Z13.6 CARDIOVASCULAR SCREENING; LDL GOAL LESS THAN 160: ICD-10-CM

## 2020-10-29 DIAGNOSIS — Z01.30 BP CHECK: Primary | ICD-10-CM

## 2020-10-29 DIAGNOSIS — I10 ESSENTIAL HYPERTENSION: ICD-10-CM

## 2020-10-29 LAB
ALBUMIN SERPL-MCNC: 3.8 G/DL (ref 3.4–5)
ALP SERPL-CCNC: 127 U/L (ref 40–150)
ALT SERPL W P-5'-P-CCNC: 84 U/L (ref 0–70)
ANION GAP SERPL CALCULATED.3IONS-SCNC: 6 MMOL/L (ref 3–14)
AST SERPL W P-5'-P-CCNC: 34 U/L (ref 0–45)
BILIRUB SERPL-MCNC: 0.5 MG/DL (ref 0.2–1.3)
BUN SERPL-MCNC: 16 MG/DL (ref 7–30)
CALCIUM SERPL-MCNC: 9.2 MG/DL (ref 8.5–10.1)
CHLORIDE SERPL-SCNC: 104 MMOL/L (ref 94–109)
CHOLEST SERPL-MCNC: 131 MG/DL
CO2 SERPL-SCNC: 27 MMOL/L (ref 20–32)
CREAT SERPL-MCNC: 0.81 MG/DL (ref 0.66–1.25)
GFR SERPL CREATININE-BSD FRML MDRD: >90 ML/MIN/{1.73_M2}
GLUCOSE SERPL-MCNC: 98 MG/DL (ref 70–99)
HDLC SERPL-MCNC: 43 MG/DL
LDLC SERPL CALC-MCNC: 75 MG/DL
NONHDLC SERPL-MCNC: 88 MG/DL
POTASSIUM SERPL-SCNC: 4.3 MMOL/L (ref 3.4–5.3)
PROT SERPL-MCNC: 7.9 G/DL (ref 6.8–8.8)
SODIUM SERPL-SCNC: 137 MMOL/L (ref 133–144)
TRIGL SERPL-MCNC: 64 MG/DL

## 2020-10-29 PROCEDURE — 80061 LIPID PANEL: CPT | Performed by: PHYSICIAN ASSISTANT

## 2020-10-29 PROCEDURE — 80053 COMPREHEN METABOLIC PANEL: CPT | Performed by: PHYSICIAN ASSISTANT

## 2020-10-29 PROCEDURE — 99207 PR NO CHARGE NURSE ONLY: CPT

## 2021-01-04 DIAGNOSIS — I10 ESSENTIAL HYPERTENSION: ICD-10-CM

## 2021-01-05 RX ORDER — LISINOPRIL 5 MG/1
TABLET ORAL
Qty: 90 TABLET | Refills: 0 | OUTPATIENT
Start: 2021-01-05

## 2021-01-05 NOTE — TELEPHONE ENCOUNTER
Medication has been denied due to a dose adjustment.     Please call patient to schedule for a lab only and BP check with MA/PAULIE.   Sandrine Szymanski RN  January 5, 2021

## 2021-01-07 ENCOUNTER — ALLIED HEALTH/NURSE VISIT (OUTPATIENT)
Dept: FAMILY MEDICINE | Facility: OTHER | Age: 46
End: 2021-01-07
Payer: COMMERCIAL

## 2021-01-07 ENCOUNTER — APPOINTMENT (OUTPATIENT)
Dept: LAB | Facility: OTHER | Age: 46
End: 2021-01-07
Payer: COMMERCIAL

## 2021-01-07 VITALS — SYSTOLIC BLOOD PRESSURE: 126 MMHG | HEART RATE: 78 BPM | DIASTOLIC BLOOD PRESSURE: 70 MMHG

## 2021-01-07 DIAGNOSIS — Z01.30 BP CHECK: Primary | ICD-10-CM

## 2021-01-07 PROCEDURE — 99207 PR NO CHARGE NURSE ONLY: CPT

## 2021-01-07 NOTE — PROGRESS NOTES
Alexey Underwood is a 45 year old patient who comes in today for a Blood Pressure check.  Initial BP:  /70      Data Unavailable  Disposition: results routed to provider    Elena Hansen MA

## 2021-01-07 NOTE — TELEPHONE ENCOUNTER
"Requested Prescriptions   Refused Prescriptions Disp Refills     lisinopril (ZESTRIL) 5 MG tablet [Pharmacy Med Name: LISINOPRIL 5MG TABLETS] 90 tablet 0     Sig: TAKE 1 TABLET(5 MG) BY MOUTH DAILY       ACE Inhibitors (Including Combos) Protocol Passed - 1/4/2021  4:01 AM        Passed - Blood pressure under 140/90 in past 12 months     BP Readings from Last 3 Encounters:   01/07/21 126/70   10/29/20 120/68   10/14/20 (!) 142/102                 Passed - Recent (12 mo) or future (30 days) visit within the authorizing provider's specialty     Patient has had an office visit with the authorizing provider or a provider within the authorizing providers department within the previous 12 mos or has a future within next 30 days. See \"Patient Info\" tab in inbasket, or \"Choose Columns\" in Meds & Orders section of the refill encounter.              Passed - Medication is active on med list        Passed - Patient is age 18 or older        Passed - Normal serum creatinine on file in past 12 months     Recent Labs   Lab Test 10/29/20  1059   CR 0.81       Ok to refill medication if creatinine is low          Passed - Normal serum potassium on file in past 12 months     Recent Labs   Lab Test 10/29/20  1059   POTASSIUM 4.3                  "

## 2021-11-22 DIAGNOSIS — I10 ESSENTIAL HYPERTENSION: ICD-10-CM

## 2021-11-22 NOTE — TELEPHONE ENCOUNTER
Reason for Call:  Medication or medication refill:    Do you use a [facility]?  Name of the pharmacy and phone number for the current request:  [facility]    Name of the medication requested: Lisinopril -Patient sent up a appointment but he ran out of medication yesterday    Other request:     Can we leave a detailed message on this number? NO    Phone number patient can be reached at: Home number on file [phone] (home)    Best Time:     Call taken on 11/22/2021 at 11:30 AM by Suly Priest

## 2021-11-23 RX ORDER — LISINOPRIL 10 MG/1
10 TABLET ORAL DAILY
Qty: 30 TABLET | Refills: 0 | Status: SHIPPED | OUTPATIENT
Start: 2021-11-23 | End: 2021-12-06

## 2021-11-23 NOTE — TELEPHONE ENCOUNTER
Pending Prescriptions:                       Disp   Refills    lisinopril (ZESTRIL) 10 MG tablet          90 tab*3        Sig: Take 1 tablet (10 mg) by mouth daily    Routing refill request to provider for review/approval because:  Labs not current:    Creatinine   Date Value Ref Range Status   10/29/2020 0.81 0.66 - 1.25 mg/dL Final     Potassium   Date Value Ref Range Status   10/29/2020 4.3 3.4 - 5.3 mmol/L Final     Appointments in Next Year      Dec 06, 2021  7:20 AM  (Arrive by 7:00 AM)  Office Visit with Magdiel Foley MD  [facility] ([facility] ) [phone]

## 2021-12-03 NOTE — PROGRESS NOTES
"  Assessment & Plan     Snoring  Tired  More likely does have sleep apnea.  Will place sleep eval for him.  - SLEEP EVALUATION & MANAGEMENT REFERRAL - ADULT -; Future    Morbid obesity (H)  BMI 43.93  Likely contributing to his sleep apnea symptoms as well as tiredness.  Patient does wish to visit a nutritionist about his weight and weight management.  Did discuss with him at length about nutrition as well as reading labels and cutting down on foods such as high sugar foods and high carbohydrate foods.  Encourage more roughage foods such as vegetables and more fiber intake.  Also discussed about more activity including simply going for a walk and he agrees.  - Nutrition Referral; Future    Essential hypertension  More so controlled now on 10 mg.  We will keep him at the 10 mg for now but will keep a close eye on it.  Refill placed today  - lisinopril (ZESTRIL) 10 MG tablet; Take 1 tablet (10 mg) by mouth daily    Stress  Likely contributing from him being tired most days from his sleep apnea.  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  Would like to keep a close eye on this as well, I do think that if he is sleeping better at night and fixing his sleep apnea that this will improve.  Encouraged healthy lifestyle measures to help improve stress including things discussed above.  MALI elevated today at 17.  PHQ looks good.         BMI:   Estimated body mass index is 43.93 kg/m  as calculated from the following:    Height as of this encounter: 1.74 m (5' 8.5\").    Weight as of this encounter: 133 kg (293 lb 3.2 oz).   Weight management plan: Discussed healthy diet and exercise guidelines  Also referral to nutritionist was placed      Return in about 6 weeks (around 1/17/2022) for Follow up - BP, stress.    MARRY DONNELLY MD  [facility] PAULA Blackburn is a 46 year old who presents for the following health issues     History of Present Illness       Hypertension: He " "presents for follow up of hypertension.  He does not check blood pressure  regularly outside of the clinic. Outpatient blood pressures have not been over 140/90. He follows a low salt diet.     He eats 0-1 servings of fruits and vegetables daily.He consumes 3 sweetened beverage(s) daily.He exercises with enough effort to increase his heart rate 9 or less minutes per day.  He exercises with enough effort to increase his heart rate 3 or less days per week.   He is taking medications regularly.       Abnormal Mood Symptoms  Onset/Duration: 6 months  Description: Feels Stressed  Depression (if yes, do PHQ-9): no  Anxiety (if yes, do MALI-7): YES  Accompanying Signs & Symptoms:  Still participating in activities that you used to enjoy: YES  Fatigue: YES  Irritability: YES  Difficulty concentrating: no  Changes in appetite: no  Problems with sleep: YES  Heart racing/beating fast: YES  Abnormally elevated, expansive, or irritable mood: no  Persistently increased activity or energy: YES  Thoughts of hurting yourself or others: no  History:  Recent stress or major life event: YES-work is very busy  Prior depression or anxiety: None  Family history of depression or anxiety: no  Alcohol/drug use: no  Difficulty sleeping: YES  Precipitating or alleviating factors: work stressors  Therapies tried and outcome: none  PHQ 12/6/2021   PHQ-9 Total Score 7   Q9: Thoughts of better off dead/self-harm past 2 weeks Not at all     MALI-7 SCORE 12/6/2021   Total Score 17         Review of Systems   Constitutional, HEENT, cardiovascular, pulmonary, gi and gu systems are negative, except as otherwise noted.      Objective    /84   Pulse 80   Temp 97.6  F (36.4  C) (Temporal)   Resp 18   Ht 1.74 m (5' 8.5\")   Wt 133 kg (293 lb 3.2 oz)   SpO2 100%   BMI 43.93 kg/m    Body mass index is 43.93 kg/m .  Physical Exam  Vitals and nursing note reviewed.   Constitutional:       General: He is not in acute distress.     Appearance: Normal " appearance. He is not ill-appearing, toxic-appearing or diaphoretic.   HENT:      Head: Normocephalic.      Right Ear: External ear normal.      Left Ear: External ear normal.      Nose: No rhinorrhea.   Eyes:      General:         Right eye: No discharge.         Left eye: No discharge.      Extraocular Movements: Extraocular movements intact.      Conjunctiva/sclera: Conjunctivae normal.      Pupils: Pupils are equal, round, and reactive to light.   Pulmonary:      Effort: Pulmonary effort is normal. No respiratory distress.   Musculoskeletal:         General: Normal range of motion.      Cervical back: Normal range of motion.   Neurological:      Mental Status: He is alert and oriented to person, place, and time.   Psychiatric:         Mood and Affect: Mood normal.         Behavior: Behavior normal.

## 2021-12-06 ENCOUNTER — TELEPHONE (OUTPATIENT)
Dept: FAMILY MEDICINE | Facility: OTHER | Age: 46
End: 2021-12-06

## 2021-12-06 ENCOUNTER — OFFICE VISIT (OUTPATIENT)
Dept: FAMILY MEDICINE | Facility: OTHER | Age: 46
End: 2021-12-06
Payer: COMMERCIAL

## 2021-12-06 VITALS
HEIGHT: 69 IN | BODY MASS INDEX: 43.43 KG/M2 | OXYGEN SATURATION: 100 % | HEART RATE: 80 BPM | DIASTOLIC BLOOD PRESSURE: 84 MMHG | RESPIRATION RATE: 18 BRPM | TEMPERATURE: 97.6 F | WEIGHT: 293.2 LBS | SYSTOLIC BLOOD PRESSURE: 136 MMHG

## 2021-12-06 DIAGNOSIS — R53.83 TIRED: ICD-10-CM

## 2021-12-06 DIAGNOSIS — F43.9 STRESS: ICD-10-CM

## 2021-12-06 DIAGNOSIS — R06.83 SNORING: Primary | ICD-10-CM

## 2021-12-06 DIAGNOSIS — I10 ESSENTIAL HYPERTENSION: Chronic | ICD-10-CM

## 2021-12-06 DIAGNOSIS — E66.01 MORBID OBESITY (H): ICD-10-CM

## 2021-12-06 PROCEDURE — 96127 BRIEF EMOTIONAL/BEHAV ASSMT: CPT | Performed by: STUDENT IN AN ORGANIZED HEALTH CARE EDUCATION/TRAINING PROGRAM

## 2021-12-06 PROCEDURE — 99214 OFFICE O/P EST MOD 30 MIN: CPT | Performed by: STUDENT IN AN ORGANIZED HEALTH CARE EDUCATION/TRAINING PROGRAM

## 2021-12-06 RX ORDER — LISINOPRIL 10 MG/1
10 TABLET ORAL DAILY
Qty: 90 TABLET | Refills: 3 | Status: SHIPPED | OUTPATIENT
Start: 2021-12-06 | End: 2022-12-23

## 2021-12-06 ASSESSMENT — ANXIETY QUESTIONNAIRES
GAD7 TOTAL SCORE: 17
IF YOU CHECKED OFF ANY PROBLEMS ON THIS QUESTIONNAIRE, HOW DIFFICULT HAVE THESE PROBLEMS MADE IT FOR YOU TO DO YOUR WORK, TAKE CARE OF THINGS AT HOME, OR GET ALONG WITH OTHER PEOPLE: VERY DIFFICULT
3. WORRYING TOO MUCH ABOUT DIFFERENT THINGS: NEARLY EVERY DAY
5. BEING SO RESTLESS THAT IT IS HARD TO SIT STILL: NEARLY EVERY DAY
2. NOT BEING ABLE TO STOP OR CONTROL WORRYING: NEARLY EVERY DAY
1. FEELING NERVOUS, ANXIOUS, OR ON EDGE: NEARLY EVERY DAY
7. FEELING AFRAID AS IF SOMETHING AWFUL MIGHT HAPPEN: SEVERAL DAYS
6. BECOMING EASILY ANNOYED OR IRRITABLE: SEVERAL DAYS

## 2021-12-06 ASSESSMENT — PATIENT HEALTH QUESTIONNAIRE - PHQ9: 5. POOR APPETITE OR OVEREATING: NEARLY EVERY DAY

## 2021-12-06 ASSESSMENT — PAIN SCALES - GENERAL: PAINLEVEL: NO PAIN (0)

## 2021-12-06 ASSESSMENT — MIFFLIN-ST. JEOR: SCORE: 2202.45

## 2021-12-06 NOTE — TELEPHONE ENCOUNTER
Nutrition Education Scheduling Outreach #1:    Call to patient to schedule. Left message with phone number to call to schedule.    Plan for 2nd outreach attempt within 1 week.    Janina Lakhani  [facility] [place] [facility]  Diabetes and Nutrition Scheduling

## 2021-12-06 NOTE — PATIENT INSTRUCTIONS
Make a diary of your blood pressure  -Take your blood pressure every day or every other day at the same time and write them down  -Sit for 5 minutes before taking your pressure  -While taking your blood pressure, make sure feet are flat on the ground, arm is at chest level, and you are still and quiet.  -You can buy a blood pressure monitor at [facility], [facility], or [facility] (be sure to get the arm blood pressure cuff, they can range from $15-$40)  -Bring your blood pressure cuff and blood pressure diary to the next doctor visit.           PATIENT EDUCATION  DASH diet: Healthy eating to lower your blood pressure      Eating Heart-Healthy Food: Using the DASH Plan    Eating for your heart doesn t have to be hard or boring. You just need to know how to make healthier choices. The DASH eating plan has been developed to help you do just that. DASH stands for Dietary Approaches to Stop Hypertension. It is a plan that has been proven to be healthier for your heart and to lower your risk for high blood pressure. It can also help lower your risk for cancer, heart disease, osteoporosis, and diabetes.  Choosing from each food group  Choose foods from each of the food groups below each day. Try to get the recommended number of servings for each food group. The serving numbers are based on a diet of 2,000 calories a day. Talk with your healthcare provider if you re not sure about your calorie needs. Along with getting the correct servings, the DASH plan also advises less than 2,300 mg of salt (sodium) per day. Lowering sodium intake to 1,500 mg per day lowers blood pressure even more. (There's about 2,300 mg of sodium in 1 teaspoon of salt.)      Grains  Servings: 6 to 8 a day  A serving is:    1 slice bread    1 ounce dry cereal    Half a cup cooked rice, pasta or cereal  Best choices: Whole grains and any grains high in fiber. Vegetables  Servings: 4 to 5 a day  A serving is:    1 cup raw leafy vegetable    Half a cup  cut-up raw or cooked vegetable    Half a cup vegetable juice  Best choices: Fresh or frozen vegetables prepared without added salt or fat.   Fruits  Servings: 4 to 5 a day  A serving is:    1 medium fruit    One-quarter cup dried fruit    Half a cup fresh, frozen, or canned fruit    Half a cup of 100% fruit juices  Best choices: A variety of fresh fruits of different colors. Whole fruits are a better choice than fruit juices. Low-fat or fat-free dairy  Servings: 2 to 3 a day  A serving is:    1 cup milk    1 cup yogurt    One and a half ounces cheese  Best choices: Skim or 1% milk, low-fat or fat-free yogurt or buttermilk, and low-fat cheeses.         Lean meats, poultry, fish  Servings: 6 or fewer a day  A serving is:    1 ounce cooked meats, poultry, or fish    1 egg  Best choices: Lean poultry and fish. Trim away visible fat. Broil, grill, roast, or boil instead of frying. Remove skin from poultry before eating. Limit how much red meat you eat.  Nuts, seeds, beans  Servings: 4 to 5 a week  A serving is:    One-third cup nuts (one and a half ounces)    2 tablespoons nut butter or seeds    Half a cup cooked dry beans or legumes  Best choices: Dry roasted nuts with no salt added, lentils, kidney beans, garbanzo beans, and whole griffith beans.   Fats and oils  Servings: 2 to 3 a day  A serving is:    1 teaspoon vegetable oil    1 teaspoon soft margarine    1 tablespoon mayonnaise    2 tablespoons salad dressing  Best choices: Nut and vegetable oils (nontropical vegetable oils), such as olive and canola oil. Sweets  Servings: 5 a week or fewer  A serving is:    1 tablespoon sugar, maple syrup, or honey    1 tablespoon jam or jelly    1 half-ounce jelly beans (about 15)    1 cup lemonade  Best choices: Dried fruit can be a satisfying sweet. Choose low-fat sweets. And watch your serving sizes!      For more on the DASH eating plan, visit:  www.nhlbi.nih.gov/health/health-topics/topics/dash   Annamarie last reviewed this  educational content on 7/1/2019 2000-2021 [employer]. All rights reserved. This information is not intended as a substitute for professional medical care. Always follow your healthcare professional's instructions.

## 2021-12-07 ASSESSMENT — PATIENT HEALTH QUESTIONNAIRE - PHQ9: SUM OF ALL RESPONSES TO PHQ QUESTIONS 1-9: 7

## 2021-12-07 ASSESSMENT — ANXIETY QUESTIONNAIRES: GAD7 TOTAL SCORE: 17

## 2021-12-13 NOTE — TELEPHONE ENCOUNTER
Nutrition Education Scheduling Outreach #2:    Call to patient to schedule. Left message with phone number to call to schedule.    Janina Lakhani  [place] [facility]  Diabetes and Nutrition Scheduling

## 2022-01-25 NOTE — PROGRESS NOTES
Does Alexey have a CPAP/Bipap?  No         [place] Sleep Scale: 13      Sleep Consultation:    Date on this visit: 1/26/2022    Alexey Underwood is sent by Magdiel Foley for a sleep consultation regarding snoring, daytime fatigue.     Primary Physician: Es Mccoy     Alexey Underwood reports nightly snoring and occasional poor quality of sleep for many years.     Alexey goes to sleep at 11:00 PM during the week. He wakes up at 7:00 AM without an alarm. He falls asleep in 5 minutes.  Alexey denies difficulty falling asleep.  He wakes up 1-2 times a night for 5 minutes before falling back to sleep.  Alexey wakes up to go to the bathroom.  On weekends, Alexey goes to sleep at 11:00 PM.  He wakes up at 9:00 AM without an alarm. He falls asleep in 5 minutes.  Patient gets an average of 7 hours of sleep per night.     Patient does not use electronics in bed, watch TV in bed and read in bed.     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  He works day and evening shifts.      Alexey does snore every night. Patient does have a regular bed partner. There is report of snoring and gasping.  He does have witnessed apneas. They occasionally sleep separately.  Patient sleeps on his stomach. He has occasional morning dry mouth and restless legs,. Alexey has occasional bruxism.    He denies sleep walking, sleep talking, enuresis and sleep terrors as a child.  Alexey has claustrophobia and anxiety.      Alexey has gained 30-40 pounds in the last year.  Patient describes themself as neither a morning or night person.  He would prefer to go to sleep at 11:00 PM and wake up at 9:30 AM.  Patient's [place] Sleepiness score 13/24 consistent with excessive  daytime sleepiness.      Alexey naps 0 times per week. He takes some inadvertant naps.  He denies closing eyes, dozing and falling asleep while driving.  Patient was counseled on the importance of driving while alert, to pull over if drowsy, or nap before getting into the vehicle if sleepy.  " He uses 1 energy drinks/day. Last caffeine intake is usually before 5 PM.    Allergies:    Allergies   Allergen Reactions     No Known Allergies        Medications:    Current Outpatient Medications   Medication Sig Dispense Refill     FIBER PO Take  by mouth.       lisinopril (ZESTRIL) 10 MG tablet Take 1 tablet (10 mg) by mouth daily 90 tablet 3     Melatonin 10 MG CAPS  (Patient not taking: Reported on 12/6/2021)         Problem List:  Patient Active Problem List    Diagnosis Date Noted     BMI 40.0-44.9, adult (H) 12/06/2021     Priority: Medium     Morbid obesity (H) 10/21/2019     Priority: Medium     Essential hypertension 10/21/2019     Priority: Medium     Renal colic 12/19/2013     Priority: Medium     Left       Diverticulosis of colon- has had a few flares of \"-itis\" 10/28/2011     Priority: Medium     CARDIOVASCULAR SCREENING; LDL GOAL LESS THAN 160 10/31/2010     Priority: Medium        Past Medical/Surgical History:  Past Medical History:   Diagnosis Date     Diverticulosis      Past Surgical History:   Procedure Laterality Date     NO HISTORY OF SURGERY         Social History:  Social History     Socioeconomic History     Marital status:      Spouse name: Ryanne     Number of children: 1     Years of education: Not on file     Highest education level: Not on file   Occupational History     Occupation:      Employer: SELF EMPLOYED   Tobacco Use     Smoking status: Never Smoker     Smokeless tobacco: Never Used   Vaping Use     Vaping Use: Never used   Substance and Sexual Activity     Alcohol use: Yes     Comment: 1-2 times/month     Drug use: No     Sexual activity: Never   Other Topics Concern     Parent/sibling w/ CABG, MI or angioplasty before 65F 55M? Not Asked   Social History Narrative     Not on file     Social Determinants of Health     Financial Resource Strain: Not on file   Food Insecurity: Not on file   Transportation Needs: Not on file   Physical Activity: Not on file "   Stress: Not on file   Social Connections: Not on file   Intimate Partner Violence: Not on file   Housing Stability: Not on file       Family History:  Family History   Problem Relation Age of Onset     Hypertension Father      Diabetes Maternal Grandmother      Diabetes Maternal Grandfather      Asthma No family hx of      C.A.D. No family hx of      Cerebrovascular Disease No family hx of      Breast Cancer No family hx of      Cancer - colorectal No family hx of      Prostate Cancer No family hx of      Alcohol/Drug No family hx of      Allergies No family hx of      Alzheimer Disease No family hx of      Anesthesia Reaction No family hx of      Arthritis No family hx of      Blood Disease No family hx of      Cancer No family hx of      Cardiovascular No family hx of      Circulatory No family hx of      Connective Tissue Disorder No family hx of      Congenital Anomalies No family hx of      Depression No family hx of      Eye Disorder No family hx of      Genetic Disorder No family hx of      Gastrointestinal Disease No family hx of      Genitourinary Problems No family hx of      Gynecology No family hx of      Heart Disease No family hx of      Lipids No family hx of      Musculoskeletal Disorder No family hx of      Neurologic Disorder No family hx of      Obesity No family hx of      Osteoporosis No family hx of      Psychotic Disorder No family hx of      Respiratory No family hx of      Thyroid Disease No family hx of      Hearing Loss No family hx of        Review of Systems:  A complete review of systems reviewed by me is negative with the exeption of what has been mentioned in the history of present illness.  CONSTITUTIONAL: NEGATIVE for weight gain/loss, fever, chills, sweats or night sweats, drug allergies.  EYES: NEGATIVE for changes in vision, blind spots, double vision.  ENT: NEGATIVE for ear pain, sore throat, sinus pain, post-nasal drip, runny nose, bloody nose  CARDIAC:  POSITIVE for  High  blood pressure  NEUROLOGIC: NEGATIVE headaches, weakness or numbness in the arms or legs.  DERMATOLOGIC: NEGATIVE for rashes, new moles or change in mole(s)  PULMONARY:  POSITIVE for  SOB with activity and wheezing   GASTROINTESTINAL:  POSITIVE for  loose or watery stools  GENITOURINARY: NEGATIVE for pain during urination, blood in urine, urinating more frequently than usual, irregular menstrual periods.  MUSCULOSKELETAL: NEGATIVE for muscle pain, bone or joint pain, swollen joints.  ENDOCRINE: NEGATIVE for increased thirst or urination, diabetes.  LYMPHATIC: NEGATIVE for swollen lymph nodes, lumps or bumps in the breasts or nipple discharge.    Physical Examination:  Vitals: There were no vitals taken for this visit.  BMI= There is no height or weight on file to calculate BMI.         [place] Total Score 5/5/2020   Total score - [place] 6            GENERAL APPEARANCE: healthy, alert, no distress and over weight  EYES: Eyes grossly normal to inspection, PERRL and conjunctivae and sclerae normal  HENT: nose and mouth without ulcers or lesions, oropharynx crowded and normal cephalic/atraumatic  NECK: no adenopathy, no asymmetry, masses, or scars and trachea midline and normal to palpation  RESP: lungs clear to auscultation - no rales, rhonchi or wheezes  CV: regular rates and rhythm, normal S1 S2, no S3 or S4 and no murmur, click or rub  MS: extremities normal- no gross deformities noted  PSYCH: mentation appears normal and affect normal/bright  Mallampati Class: IV.  Tonsillar Stage: 1  hidden by pillars.    Impression/Plan:  Snoring, witnessed apneas, EDS in a patient with hypertension, Family hx mirela in father. Home sleep study ordered for suspected sleep apnea.   Literature provided regarding sleep apnea.      He will follow up with me in approximately two weeks after his sleep study has been competed to review the results and discuss plan of care.       Polysomnography reviewed.  Obstructive sleep apnea  reviewed.  Complications of untreated sleep apnea were reviewed.        CC: Magdiel Foley

## 2022-01-26 ENCOUNTER — OFFICE VISIT (OUTPATIENT)
Dept: SLEEP MEDICINE | Facility: CLINIC | Age: 47
End: 2022-01-26
Payer: COMMERCIAL

## 2022-01-26 VITALS
WEIGHT: 278 LBS | DIASTOLIC BLOOD PRESSURE: 94 MMHG | HEIGHT: 69 IN | BODY MASS INDEX: 41.18 KG/M2 | SYSTOLIC BLOOD PRESSURE: 142 MMHG | HEART RATE: 76 BPM | OXYGEN SATURATION: 96 %

## 2022-01-26 DIAGNOSIS — R06.83 SNORING: ICD-10-CM

## 2022-01-26 DIAGNOSIS — R53.83 TIRED: ICD-10-CM

## 2022-01-26 DIAGNOSIS — R29.818 SUSPECTED SLEEP APNEA: Primary | ICD-10-CM

## 2022-01-26 PROCEDURE — 99203 OFFICE O/P NEW LOW 30 MIN: CPT | Performed by: PHYSICIAN ASSISTANT

## 2022-01-26 ASSESSMENT — MIFFLIN-ST. JEOR: SCORE: 2133.44

## 2022-01-26 NOTE — NURSING NOTE
Weight management plan: Patient was referred to their PCP to discuss a diet and exercise plan.     Alexey to follow up with Primary Care provider regarding elevated blood pressure.

## 2022-01-26 NOTE — PATIENT INSTRUCTIONS
"      MY TREATMENT INFORMATION FOR SLEEP APNEA-  Alexey Underwood    DOCTOR : TOSHIA Ramos-IVANA    Am I having a sleep study at a sleep center?  --->Due to insurance clearance delays, you will be contacted within 2-4 weeks to schedule    Am I having a home sleep study?  --->Watch the video for the device you are using:    -/drop off device-   https://www.Sermo.com/watch?v=yGGFBdELGhk    -Disposable device sent out require phone/computer application-   https://www.Sermo.com/watch?v=BCce_vbiwxE      Frequently asked questions:  1. What is Obstructive Sleep Apnea (AILEEN)? AILEEN is the most common type of sleep apnea. Apnea means, \"without breath.\"  Apnea is most often caused by narrowing or collapse of the upper airway as muscles relax during sleep.   Almost everyone has occasional apneas. Most people with sleep apnea have had brief interruptions at night frequently for many years.  The severity of sleep apnea is related to how frequent and severe the events are.   2. What are the consequences of AILEEN? Symptoms include: feeling sleepy during the day, snoring loudly, gasping or stopping of breathing, trouble sleeping, and occasionally morning headaches or heartburn at night.  Sleepiness can be serious and even increase the risk of falling asleep while driving. Other health consequences may include development of high blood pressure and other cardiovascular disease in persons who are susceptible. Untreated AILEEN  can contribute to heart disease, stroke and diabetes.   3. What are the treatment options? In most situations, sleep apnea is a lifelong disease that must be managed with daily therapy. Medications are not effective for sleep apnea and surgery is generally not considered until other therapies have been tried. Your treatment is your choice . Continuous Positive Airway (CPAP) works right away and is the therapy that is effective in nearly everyone. An oral device to hold your jaw forward is usually the " next most reliable option. Other options include postioning devices (to keep you off your back), weight loss, and surgery including a tongue pacing device. There is more detail about some of these options below.  4. Are my sleep studies covered by insurance? Although we will request verification of coverage, we advise you also check in advance of the study to ensure there is coverage.    Important tips for those choosing CPAP and similar devices   Know your equipment:  CPAP is continuous positive airway pressure that prevents obstructive sleep apnea by keeping the throat from collapsing while you are sleeping. In most cases, the device is  smart  and can slowly self-adjusts if your throat collapses and keeps a record every day of how well you are treated-this information is available to you and your care team.  BPAP is bilevel positive airway pressure that keeps your throat open and also assists each breath with a pressure boost to maintain adequate breathing.  Special kinds of BPAP are used in patients who have inadequate breathing from lung or heart disease. In most cases, the device is  smart  and can slowly self-adjusts to assist breathing. Like CPAP, the device keeps a record of how well you are treated.  Your mask is your connection to the device. You get to choose what feels most comfortable and the staff will help to make sure if fits. Here: are some examples of the different masks that are available:       Key points to remember on your journey with sleep apnea:  1. Sleep study.  PAP devices often need to be adjusted during a sleep study to show that they are effective and adjusted right.  2. Good tips to remember: Try wearing just the mask during a quiet time during the day so your body adapts to wearing it. A humidifier is recommended for comfort in most cases to prevent drying of your nose and throat. Allergy medication from your provider may help you if you are having nasal congestion.  3. Getting  settled-in. It takes more than one night for most of us to get used to wearing a mask. Try wearing just the mask during a quiet time during the day so your body adapts to wearing it. A humidifier is recommended for comfort in most cases. Our team will work with you carefully on the first day and will be in contact within 4 days and again at 2 and 4 weeks for advice and remote device adjustments. Your therapy is evaluated by the device each day.   4. Use it every night. The more you are able to sleep naturally for 7-8 hours, the more likely you will have good sleep and to prevent health risks or symptoms from sleep apnea. Even if you use it 4 hours it helps. Occasionally all of us are unable to use a medical therapy, in sleep apnea, it is not dangerous to miss one night.   5. Communicate. Call our skilled team on the number provided on the first day if your visit for problems that make it difficult to wear the device. Over 2 out of 3 patients can learn to wear the device long-term with help from our team. Remember to call our team or your sleep providers if you are unable to wear the device as we may have other solutions for those who cannot adapt to mask CPAP therapy. It is recommended that you sleep your sleep provider within the first 3 months and yearly after that if you are not having problems.   6. Use it for your health. We encourage use of CPAP masks during daytime quiet periods to allow your face and brain to adapt to the sensation of CPAP so that it will be a more natural sensation to awaken to at night or during naps. This can be very useful during the first few weeks or months of adapting to CPAP though it does not help medically to wear CPAP during wakefulness and  should not be used as a strategy just to meet guidelines.  7. Take care of your equipment. Make sure you clean your mask and tubing using directions every day and that your filter and mask are replaced as recommended or if they are not  working.     BESIDES CPAP, WHAT OTHER THERAPIES ARE THERE?    Positioning Device  Positioning devices are generally used when sleep apnea is mild and only occurs on your back.This example shows a pillow that straps around the waist. It may be appropriate for those whose sleep study shows milder sleep apnea that occurs primarily when lying flat on one's back. Preliminary studies have shown benefit but effectiveness at home may need to be verified by a home sleep test. These devices are generally not covered by medical insurance.  Examples of devices that maintain sleeping on the back to prevent snoring and mild sleep apnea.    Belt type body positioner  http://[employer]Hintsoft/    Electronic reminder  http://nightshifttherapy.com/  http://www.[employer].Hintsoft.au/      Oral Appliance  What is oral appliance therapy?  An oral appliance device fits on your teeth at night like a retainer used after having braces. The device is made by a specialized dentist and requires several visits over 1-2 months before a manufactured device is made to fit your teeth and is adjusted to prevent your sleep apnea. Once an oral device is working properly, snoring should be improved. A home sleep test may be recommended at that time if to determine whether the sleep apnea is adequately treated.       Some things to remember:  -Oral devices are often, but not always, covered by your medical insurance. Be sure to check with your insurance provider.   -If you are referred for oral therapy, you will be given a list of specialized dentists to consider or you may choose to visit the Web site of the American Academy of Dental Sleep Medicine  -Oral devices are less likely to work if you have severe sleep apnea or are extremely overweight.     More detailed information  An oral appliance is a small acrylic device that fits over the upper and lower teeth  (similar to a retainer or a mouth guard). This device slightly moves jaw forward, which moves the base  of the tongue forward, opens the airway, improves breathing for effective treat snoring and obstructive sleep apnea in perhaps 7 out of 10 people .  The best working devices are custom-made by a dental device  after a mold is made of the teeth 1, 2, 3.  When is an oral appliance indicated?  Oral appliance therapy is recommended as a first-line treatment for patients with primary snoring, mild sleep apnea, and for patients with moderate sleep apnea who prefer appliance therapy to use of CPAP4, 5. Severity of sleep apnea is determined by sleep testing and is based on the number of respiratory events per hour of sleep.   How successful is oral appliance therapy?  The success rate of oral appliance therapy in patients with mild sleep apnea is 75-80% while in patients with moderate sleep apnea it is 50-70%. The chance of success in patients with severe sleep apnea is 40-50%. The research also shows that oral appliances have a beneficial effect on the cardiovascular health of AILEEN patients at the same magnitude as CPAP therapy7.  Oral appliances should be a second-line treatment in cases of severe sleep apnea, but if not completely successful then a combination therapy utilizing CPAP plus oral appliance therapy may be effective. Oral appliances tend to be effective in a broad range of patients although studies show that the patients who have the highest success are females, younger patients, those with milder disease, and less severe obesity. 3, 6.   Finding a dentist that practices dental sleep medicine  Specific training is available through the American Academy of Dental Sleep Medicine for dentists interested in working in the field of sleep. To find a dentist who is educated in the field of sleep and the use of oral appliances, near you, visit the Web site of the American Academy of Dental Sleep Medicine.    References  1. donald Simpson al. Objectively measured vs self-reported compliance during oral  appliance therapy for sleep-disordered breathing. Chest 2013; 144(5): 7466-0010.  2. Angelo, et al. Objective measurement of compliance during oral appliance therapy for sleep-disordered breathing. Thorax 2013; 68(1): 91-96.  3. Tung et al. Mandibular advancement devices in 620 men and women with AILEEN and snoring: tolerability and predictors of treatment success. Chest 2004; 125: 5950-1995.  4. Jose, et al. Oral appliances for snoring and AILEEN: a review. Sleep 2006; 29: 244-262.  5. Danica et al. Oral appliance treatment for AILEEN: an update. J Clin Sleep Med 2014; 10(2): 215-227.  6. Lety et al. Predictors of OSAH treatment outcome. J Dent Res 2007; 86: 9223-2146.      Weight Loss:    Weight loss is a long-term strategy that may improve sleep apnea in some patients.    Weight management is a personal decision and the decision should be based on your interest and the potential benefits.  If you are interested in exploring weight loss strategies, the following discussion covers the impact on weight loss on sleep apnea and the approaches that may be successful.    Being overweight does not necessarily mean you will have health consequences.  Those who have BMI over 35 or over 27 with existing medical conditions carries greater risk.   Weight loss decreases severity of sleep apnea in most people with obesity. For those with mild obesity who have developed snoring with weight gain, even 15-30 pound weight loss can improve and occasionally eliminate sleep apnea.  Structured and life-long dietary and health habits are necessary to lose weight and keep healthier weight levels.     Though there may be significant health benefits from weight loss, long-term weight loss is very difficult to achieve- studies show success with dietary management in less than 10% of people. In addition, substantial weight loss may require years of dietary control and may be difficult if patients have severe obesity. In these  cases, surgical management may be considered.  Finally, older individuals who have tolerated obesity without health complications may be less likely to benefit from weight loss strategies.        Your BMI is Body mass index is 41.65 kg/m .  Weight management is a personal decision.  If you are interested in exploring weight loss strategies, the following discussion covers the approaches that may be successful. Body mass index (BMI) is one way to tell whether you are at a healthy weight, overweight, or obese. It measures your weight in relation to your height.  A BMI of 18.5 to 24.9 is in the healthy range. A person with a BMI of 25 to 29.9 is considered overweight, and someone with a BMI of 30 or greater is considered obese. More than two-thirds of American adults are considered overweight or obese.  Being overweight or obese increases the risk for further weight gain. Excess weight may lead to heart disease and diabetes.  Creating and following plans for healthy eating and physical activity may help you improve your health.  Weight control is part of healthy lifestyle and includes exercise, emotional health, and healthy eating habits. Careful eating habits lifelong are the mainstay of weight control. Though there are significant health benefits from weight loss, long-term weight loss with diet alone may be very difficult to achieve- studies show long-term success with dietary management in less than 10% of people. Attaining a healthy weight may be especially difficult to achieve in those with severe obesity. In some cases, medications, devices and surgical management might be considered.  What can you do?  If you are overweight or obese and are interested in methods for weight loss, you should discuss this with your provider.     Consider reducing daily calorie intake by 500 calories.     Keep a food journal.     Avoiding skipping meals, consider cutting portions instead.    Diet combined with exercise helps  maintain muscle while optimizing fat loss. Strength training is particularly important for building and maintaining muscle mass. Exercise helps reduce stress, increase energy, and improves fitness. Increasing exercise without diet control, however, may not burn enough calories to loose weight.       Start walking three days a week 10-20 minutes at a time    Work towards walking thirty minutes five days a week     Eventually, increase the speed of your walking for 1-2 minutes at time    And look into health and wellness programs that may be available through your health insurance provider, employer, local community center, or emir club.          Surgery:    Surgery for obstructive sleep apnea is considered generally only when other therapies fail to work. Surgery may be discussed with you if you are having a difficult time tolerating CPAP and or when there is an abnormal structure that requires surgical correction.  Nose and throat surgeries often enlarge the airway to prevent collapse.  Most of these surgeries create pain for 1-2 weeks and up to half of the most common surgeries are not effective throughout life.  You should carefully discuss the benefits and drawbacks to surgery with your sleep provider and surgeon to determine if it is the best solution for you.   More information  Surgery for AILEEN is directed at areas that are responsible for narrowing or complete obstruction of the airway during sleep.  There are a wide range of procedures available to enlarge and/or stabilize the airway to prevent blockage of breathing in the three major areas where it can occur: the palate, tongue, and nasal regions.  Successful surgical treatment depends on the accurate identification of the factors responsible for obstructive sleep apnea in each person.  A personalized approach is required because there is no single treatment that works well for everyone.  Because of anatomic variation, consultation with an examination by a  sleep surgeon is a critical first step in determining what surgical options are best for each patient.  In some cases, examination during sedation may be recommended in order to guide the selection of procedures.  Patients will be counseled about risks and benefits as well as the typical recovery course after surgery. Surgery is typically not a cure for a person s AILEEN.  However, surgery will often significantly improve one s AILEEN severity (termed  success rate ).  Even in the absence of a cure, surgery will decrease the cardiovascular risk associated with OSA7; improve overall quality of life8 (sleepiness, functionality, sleep quality, etc).      Palate Procedures:  Patients with AILEEN often have narrowing of their airway in the region of their tonsils and uvula.  The goals of palate procedures are to widen the airway in this region as well as to help the tissues resist collapse.  Modern palate procedure techniques focus on tissue conservation and soft tissue rearrangement, rather than tissue removal.  Often the uvula is preserved in this procedure. Residual sleep apnea is common in patient after pharyngoplasty with an average reduction in sleep apnea events of 33%2.      Tongue Procedures:  ExamWhile patients are awake, the muscles that surround the throat are active and keep this region open for breathing. These muscles relax during sleep, allowing the tongue and other structures to collapse and block breathing.  There are several different tongue procedures available.  Selection of a tongue base procedure depends on characteristics seen on physical exam.  Generally, procedures are aimed at removing bulky tissues in this area or preventing the back of the tongue from falling back during sleep.  Success rates for tongue surgery range from 50-62%3.    Hypoglossal Nerve Stimulation:  Hypoglossal nerve stimulation has recently received approval from the [place] Food and Drug Administration for the treatment of  obstructive sleep apnea.  This is based on research showing that the system was safe and effective in treating sleep apnea6.  Results showed that the median AHI score decreased 68%, from 29.3 to 9.0. This therapy uses an implant system that senses breathing patterns and delivers mild stimulation to airway muscles, which keeps the airway open during sleep.  The system consists of three fully implanted components: a small generator (similar in size to a pacemaker), a breathing sensor, and a stimulation lead.  Using a small handheld remote, a patient turns the therapy on before bed and off upon awakening.    Candidates for this device must be greater than 22 years of age, have moderate to severe AILEEN (AHI between 20-65), BMI less than 32, have tried CPAP/oral appliance without success, and have appropriate upper airway anatomy (determined by a sleep endoscopy performed by Dr. Mills).    Hypoglossal Nerve Stimulation Pathway:    The sleep surgeon s office will work with the patient through the insurance prior-authorization process (including communications and appeals).    Nasal Procedures:  Nasal obstruction can interfere with nasal breathing during the day and night.  Studies have shown that relief of nasal obstruction can improve the ability of some patients to tolerate positive airway pressure therapy for obstructive sleep apnea1.  Treatment options include medications such as nasal saline, topical corticosteroid and antihistamine sprays, and oral medications such as antihistamines or decongestants. Non-surgical treatments can include external nasal dilators for selected patients. If these are not successful by themselves, surgery can improve the nasal airway either alone or in combination with these other options.      Combination Procedures:  Combination of surgical procedures and other treatments may be recommended, particularly if patients have more than one area of narrowing or persistent positional disease.  The  success rate of combination surgery ranges from 66-80%2,3.    References  1. Shamir HEATH. The Role of the Nose in Snoring and Obstructive Sleep Apnoea: An Update.  Eur Arch Otorhinolaryngol. 2011; 268: 1365-73.  2.  Angelia SM; Keira JA; Bryan JR; Pallanch JF; Jumana MB; Marcellus SG; Tom COREY. Surgical modifications of the upper airway for obstructive sleep apnea in adults: a systematic review and meta-analysis. SLEEP 2010;33(10):7140-0617. Sylvain DOMINGO. Hypopharyngeal surgery in obstructive sleep apnea: an evidence-based medicine review.  Arch Otolaryngol Head Neck Surg. 2006 Feb;132(2):206-13.  3. Parvez YH1, Liliya Y, Kar ABENA. The efficacy of anatomically based multilevel surgery for obstructive sleep apnea. Otolaryngol Head Neck Surg. 2003 Oct;129(4):327-35.  4. Sylvain DOMINGO, Goldberg A. Hypopharyngeal Surgery in Obstructive Sleep Apnea: An Evidence-Based Medicine Review. Arch Otolaryngol Head Neck Surg. 2006 Feb;132(2):206-13.  5. Laura PJ et al. Upper-Airway Stimulation for Obstructive Sleep Apnea.  N Engl J Med. 2014 Jan 9;370(2):139-49.  6. Adan Y et al. Increased Incidence of Cardiovascular Disease in Middle-aged Men with Obstructive Sleep Apnea. Am J Respir Crit Care Med; 2002 166: 159-165  7. Lujan EM et al. Studying Life Effects and Effectiveness of Palatopharyngoplasty (SLEEP) study: Subjective Outcomes of Isolated Uvulopalatopharyngoplasty. Otolaryngol Head Neck Surg. 2011; 144: 623-631.        WHAT IF I ONLY HAVE SNORING?      Mandibular advancement devices, lateral sleep positioning, long-term weight loss and treatment of nasal allergies have been shown to improve snoring.    Exercising tongue muscles with a game (https://www.ncbi.nlm.nih.gov/pubmed/22987852) or stimulating the tongue during the day with a device (https://doi.org/10.3390/tuu33048637) have improved snoring in some individuals.    Remember to Drive Safe... Drive Alive     Sleep health profoundly affects your health, mood, and your  safety.  Thirty three percent of the population (one in three of us) is not getting enough sleep and many have a sleep disorder. Not getting enough sleep or having an untreated / undertreated sleep condition may make us sleepy without even knowing it. In fact, our driving could be dramatically impaired due to our sleep health. As your provider, here are some things I would like you to know about driving:     Here are some warning signs for impairment and dangerous drowsy driving:              -Having been awake more than 16 hours               -Looking tired               -Eyelid drooping              -Head nodding (it could be too late at this point)              -Driving for more than 30 minutes     Some things you could do to make the driving safer if you are experiencing some drowsiness:              -Stop driving and rest              -Call for transportation              -Make sure your sleep disorder is adequately treated     Some things that have been shown NOT to work when experiencing drowsiness while driving:              -Turning on the radio              -Opening windows              -Eating any  distracting  /  entertaining  foods (e.g., sunflower seeds, candy, or any other)              -Talking on the phone      Your decision may not only impact your life, but also the life of others. Please, remember to drive safe for yourself and all of us.

## 2022-02-14 ENCOUNTER — OFFICE VISIT (OUTPATIENT)
Dept: SLEEP MEDICINE | Facility: CLINIC | Age: 47
End: 2022-02-14
Payer: COMMERCIAL

## 2022-02-14 DIAGNOSIS — R53.83 TIRED: ICD-10-CM

## 2022-02-14 DIAGNOSIS — R29.818 SUSPECTED SLEEP APNEA: ICD-10-CM

## 2022-02-14 DIAGNOSIS — R06.83 SNORING: ICD-10-CM

## 2022-02-15 ENCOUNTER — DOCUMENTATION ONLY (OUTPATIENT)
Dept: SLEEP MEDICINE | Facility: CLINIC | Age: 47
End: 2022-02-15
Payer: COMMERCIAL

## 2022-02-15 NOTE — PROGRESS NOTES
HST POST-STUDY QUESTIONNAIRE    1. What time did you go to bed?  1030  2. How long do you think it took to fall asleep?  30m  3. What time did you wake up to start the day?  4  4. Did you get up during the night at all?  y  5. If you woke up, do you remember approximately what time(s)? ?  6. Did you have any difficulty with the equipment?  No  7. Did you us any type of treatment with this study?  None  8. Was the head of the bed elevated? No  9. Did you sleep in a recliner?  No Couch   10. Did you stop using CPAP at least 3 days before this test?  NA  11. Any other information you'd like us to know? Hard to sleep with

## 2022-02-16 NOTE — PROGRESS NOTES
"HOME SLEEP STUDY INTERPRETATION    Patient: Alexey Underwood  MRN: [id number]  YOB: 1975  Study Date: 2/14/2022  PCP/Referring Provider: Es Mccoy; np  Ordering Provider: Guy Cotter PA-C     Indications for Home Study: Alexey Underwood is a 46 year old adult with a history of snoring, excessive daytime sleepiness, morbid obesity, witnessed apneas who presents with symptoms suggestive of obstructive sleep apnea.    Estimated body mass index is 41.65 kg/m  as calculated from the following:    Height as of 1/26/22: 1.74 m (5' 8.5\").    Weight as of 1/26/22: 126.1 kg (278 lb).  Total score - [place]: 13 (1/26/2022  3:08 PM)  StopBang Total Score: 6 (1/26/2022  3:08 PM)Total Score: 6 (1/26/2022  3:10 PM)    Data: A full night home sleep study was performed recording the standard physiologic parameters including body position, movement, sound, nasal pressure, thermal oral airflow, chest and abdominal movements with respiratory inductance plethysmography, and oxygen saturation by pulse oximetry. Pulse rate was estimated by oximetry recording. This study was considered adequate based on > 4 hours of quality oximetry and respiratory recording. As specified by the AASM Manual for the Scoring of Sleep and Associated events, version 2.3, Rule VIII.D 1B, 4% oxygen desaturation scoring for hypopneas is used as a standard of care on all home sleep apnea testing.    Analysis Time:  290 minutes    Respiration:   Sleep Associated Hypoxemia: sustained hypoxemia was present. Baseline oxygen saturation was 93%.  Time with saturation less than or equal to 88% was 17.1 minutes. The lowest oxygen saturation was 77%.   Snoring: Snoring was present.  Respiratory events: The home study revealed a presence of 3 obstructive apneas and 0 mixed and central apneas. There were 178 hypopneas resulting in a combined apnea/hypopnea index [AHI] of 37 events per hour.  AHI was 36.4 per hour supine, 0 per hour prone, 27.1 per hour " on left side, and 40.7 per hour on right side.   Pattern: Excluding events noted above, respiratory rate and pattern was Normal.    Position: Percent of time spent: supine - 219.1%, prone - 0%, on left - 11.1%, on right - 63.3%.    Heart Rate: By pulse oximetry tachycardia was noted.     Assessment:   Severe obstructive sleep apnea.  Sleep associated hypoxemia was present.    Recommendations:  Consider auto-CPAP at 5-15 cmH2O or oral appliance therapy.(Only if patient is intolerant of CPAP or as part of hybrid therapy with CPAP).  Suggest optimizing sleep hygiene and avoiding sleep deprivation.  Weight management.(morbid obesity BMI>40).    Diagnosis Code(s): Obstructive Sleep Apnea G47.33, Hypoxemia G47.36    Antnoio Her MD, February 16, 2022   Diplomate, American Board of Otolaryngology, Sleep Medicine

## 2022-02-16 NOTE — PROGRESS NOTES
This HSAT was performed using a Noxturnal T3 device which recorded snore, sound, movement activity, body position, nasal pressure, oronasal thermal airflow, pulse, oximetry and both chest and abdominal respiratory effort. HSAT data was restricted to the time patient states they were in bed.     HSAT was scored using 1B 4% hypopnea rule.     AHI: 37.0. Snoring was reported as loud.  Time with SpO2 below 89% was 27.8 minutes.   Overall signal quality was good     Pt will follow up with sleep provider to determine appropriate therapy.     Ordering Provider, Guy Cotter PA-C Charles O. BA, [facility], RST System Clinical Specialist 2/16/2022

## 2022-03-07 ENCOUNTER — OFFICE VISIT (OUTPATIENT)
Dept: SLEEP MEDICINE | Facility: CLINIC | Age: 47
End: 2022-03-07
Payer: COMMERCIAL

## 2022-03-07 VITALS
WEIGHT: 277 LBS | HEIGHT: 68 IN | OXYGEN SATURATION: 96 % | BODY MASS INDEX: 41.98 KG/M2 | SYSTOLIC BLOOD PRESSURE: 135 MMHG | HEART RATE: 79 BPM | DIASTOLIC BLOOD PRESSURE: 87 MMHG

## 2022-03-07 DIAGNOSIS — G47.33 OSA (OBSTRUCTIVE SLEEP APNEA): Primary | ICD-10-CM

## 2022-03-07 PROCEDURE — 99213 OFFICE O/P EST LOW 20 MIN: CPT | Performed by: PHYSICIAN ASSISTANT

## 2022-03-07 NOTE — PROGRESS NOTES
"Does Alexey have a CPAP/Bipap?  No       [place] Sleep Scale: 14    Sleep Study Follow-Up Visit:    Date on this visit: 3/7/2022    Alexey Underwood comes in today for follow-up of his home sleep study done on 2/14/22  for loud snoring, excessive daytime sleepiness and possible sleep apnea.     AHI was 37, with desaturations. Supine RDI 36.4, consistent with severe SUPINE AILEEN.    These findings were reviewed with patient.     Past medical/surgical history, family history, social history, medications and allergies were reviewed.      Problem List:  Patient Active Problem List    Diagnosis Date Noted     BMI 40.0-44.9, adult (H) 12/06/2021     Priority: Medium     Morbid obesity (H) 10/21/2019     Priority: Medium     Essential hypertension 10/21/2019     Priority: Medium     Renal colic 12/19/2013     Priority: Medium     Left       Diverticulosis of colon- has had a few flares of \"-itis\" 10/28/2011     Priority: Medium     CARDIOVASCULAR SCREENING; LDL GOAL LESS THAN 160 10/31/2010     Priority: Medium        Impression/Plan:    Severe sleep apnea with hypoxemia. Will start auto pap 5-15 CMH2O  He will follow up with me in about 3 month(s).     Fifteen minutes spent with patient, all of which were spent face-to-face counseling, consulting, coordinating plan of care.          CC: Es Mccoy     "

## 2022-03-22 ENCOUNTER — TELEPHONE (OUTPATIENT)
Dept: SLEEP MEDICINE | Facility: CLINIC | Age: 47
End: 2022-03-22
Payer: COMMERCIAL

## 2022-03-22 NOTE — TELEPHONE ENCOUNTER
Called patient, left message to return call to [phone] to schedule CPAP setup.    Minnie Castillo DME Coordinator

## 2022-04-12 ENCOUNTER — DOCUMENTATION ONLY (OUTPATIENT)
Dept: SLEEP MEDICINE | Facility: CLINIC | Age: 47
End: 2022-04-12
Payer: COMMERCIAL

## 2022-04-12 NOTE — PROGRESS NOTES
Patient was offered choice of vendor and chose [facility].  Patient Alexey Underwood was set up at [place] on April 12, 2022. Patient received a Resmed Airsense 11 Pressures were set at 5-15 cm H2O.   Patient s ramp is 5 cm H2O for Off and FLEX/EPR is EPR.  Patient received a Resmed Mask name: AITFIR N30I  Nasal mask size Medium, heated tubing and heated humidifier.  Patient does not need to meet compliance. Patient has a follow up on TBD with Guy Kennedy

## 2022-04-15 ENCOUNTER — DOCUMENTATION ONLY (OUTPATIENT)
Dept: SLEEP MEDICINE | Facility: CLINIC | Age: 47
End: 2022-04-15
Payer: COMMERCIAL

## 2022-04-15 NOTE — PROGRESS NOTES
3 day Sleep therapy management telephone visit    Diagnostic AHI:  37.0  HST    Confirmed with patient at time of call- Yes Patient is still interested in STM service       Subjective measures:  Patient is on a nasal mask and he is opening his mouth and then his pressure increases and then the high pressures bother him.  Then he takes the mask off. Patient looking to get a full face mask.         Objective data     Order Settings for PAP  CPAP min 5    CPAP max 15             Device settings from machine                           Assessment: Minimal usage      Action plan: Patient to have 14 day STM visit. Patient has a follow up visit scheduled:   Patient reminded to schedule follow up visit    Replacement device: No  STM ordered by provider: Yes     Total time spent on accessing and  interpreting remote patient PAP therapy data  10 minutes    Total time spent counseling, coaching  and reviewing PAP therapy data with patient  4 minutes    [id number] no

## 2022-04-20 ENCOUNTER — TELEPHONE (OUTPATIENT)
Dept: SLEEP MEDICINE | Facility: CLINIC | Age: 47
End: 2022-04-20
Payer: COMMERCIAL

## 2022-04-26 ENCOUNTER — DOCUMENTATION ONLY (OUTPATIENT)
Dept: SLEEP MEDICINE | Facility: CLINIC | Age: 47
End: 2022-04-26
Payer: COMMERCIAL

## 2022-04-26 NOTE — PROGRESS NOTES
Patient came to [facility] for mask fitting appointment on April 26, 2022. Patient requested to switch masks because oral breathing. Discussed the following masks: AIRFIT F30I AND AIRFIT F20  Patient selected a Resmed Mask name: AIRFIT F20 Full Face mask size Large

## 2022-04-27 ENCOUNTER — DOCUMENTATION ONLY (OUTPATIENT)
Dept: SLEEP MEDICINE | Facility: CLINIC | Age: 47
End: 2022-04-27
Payer: COMMERCIAL

## 2022-04-27 NOTE — PROGRESS NOTES
14  DAY STM VISIT    Diagnostic AHI:  37.0  HST    Subjective measures:   Patient stated he received a new full face mask yesterday and wore it about 10 minutes last night and had to take his mask off because he became claustrophobic.     Assessment: Pt not meeting objective benchmarks for compliance  Patient failing following subjective benchmarks: pressure issues and mask discomfort     Action plan: pt to have 30 day [facility] visit. Patient will take his CPAP machine out of his room and will start using his CPAP machine and mask while watching TV.      Device type: Auto-CPAP    PAP settings: CPAP min 5.0 cm  H20       CPAP max 15.0 cm  H20      95th% pressure 7.7 cm  H20        RESMED EPR level Setting: TWO    RESMED Soft response setting:  OFF    Mask type:  Nasal Mask    Objective measures: 14 day rolling measures      Compliance  0 %      Leak  14  lpm  last  upload      AHI 2   last  upload      Average number of minutes 15      Objective measure goal  Compliance   Goal >70%  Leak   Goal < 24 lpm  AHI  Goal < 5  Usage  Goal >240        Total time spent on accessing and interpreting remote patient PAP therapy data  10 minutes    Total time spent counseling, coaching  and reviewing PAP therapy data with patient  8 minutes    [id number]  [id number]  no (3 day STM)

## 2022-12-23 ENCOUNTER — TELEPHONE (OUTPATIENT)
Dept: FAMILY MEDICINE | Facility: CLINIC | Age: 47
End: 2022-12-23

## 2022-12-23 DIAGNOSIS — I10 ESSENTIAL HYPERTENSION: Chronic | ICD-10-CM

## 2022-12-23 RX ORDER — LISINOPRIL 10 MG/1
10 TABLET ORAL DAILY
Qty: 30 TABLET | Refills: 0 | Status: SHIPPED | OUTPATIENT
Start: 2022-12-23 | End: 2023-01-02 | Stop reason: DRUGHIGH

## 2022-12-23 NOTE — TELEPHONE ENCOUNTER
Reason for Call:  Other prescription    Detailed comments: pt is looking for prescription to be refilled and [facility] [place] for Lisinopril 10 mg     Phone Number Patient can be reached at: Cell number on file:    Telephone Information:   Mobile [phone]       Best Time: any    Can we leave a detailed message on this number? YES    Call taken on 12/23/2022 at 11:15 AM by Lanny Danielle

## 2022-12-23 NOTE — TELEPHONE ENCOUNTER
The patient hasn't seen PCP since 2020 and saw another provider 2021. 30 day júnior refill done. Please schedule appointment with PCP.    Haider Pena MD on 12/23/2022 at 1:20 PM

## 2022-12-23 NOTE — TELEPHONE ENCOUNTER
Spoke to patient and advised him of message below. Patient verbalized understanding and scheduled appointment. Patient is aware that PCP is in [place].

## 2023-01-02 ENCOUNTER — OFFICE VISIT (OUTPATIENT)
Dept: FAMILY MEDICINE | Facility: CLINIC | Age: 48
End: 2023-01-02
Payer: COMMERCIAL

## 2023-01-02 ENCOUNTER — ANCILLARY PROCEDURE (OUTPATIENT)
Dept: GENERAL RADIOLOGY | Facility: CLINIC | Age: 48
End: 2023-01-02
Attending: PHYSICIAN ASSISTANT
Payer: COMMERCIAL

## 2023-01-02 VITALS
SYSTOLIC BLOOD PRESSURE: 152 MMHG | HEART RATE: 74 BPM | TEMPERATURE: 97.9 F | OXYGEN SATURATION: 94 % | BODY MASS INDEX: 44.95 KG/M2 | WEIGHT: 296.6 LBS | DIASTOLIC BLOOD PRESSURE: 102 MMHG | HEIGHT: 68 IN

## 2023-01-02 DIAGNOSIS — Z13.0 SCREENING FOR DEFICIENCY ANEMIA: ICD-10-CM

## 2023-01-02 DIAGNOSIS — G47.33 OSA (OBSTRUCTIVE SLEEP APNEA): ICD-10-CM

## 2023-01-02 DIAGNOSIS — E66.01 MORBID OBESITY (H): ICD-10-CM

## 2023-01-02 DIAGNOSIS — Z11.4 SCREENING FOR HIV (HUMAN IMMUNODEFICIENCY VIRUS): ICD-10-CM

## 2023-01-02 DIAGNOSIS — Z12.11 SCREEN FOR COLON CANCER: ICD-10-CM

## 2023-01-02 DIAGNOSIS — R06.09 DOE (DYSPNEA ON EXERTION): ICD-10-CM

## 2023-01-02 DIAGNOSIS — Z13.6 CARDIOVASCULAR SCREENING; LDL GOAL LESS THAN 160: ICD-10-CM

## 2023-01-02 DIAGNOSIS — Z13.1 SCREENING FOR DIABETES MELLITUS: ICD-10-CM

## 2023-01-02 DIAGNOSIS — Z11.59 NEED FOR HEPATITIS C SCREENING TEST: ICD-10-CM

## 2023-01-02 DIAGNOSIS — Z23 NEED FOR VACCINATION: ICD-10-CM

## 2023-01-02 DIAGNOSIS — I10 ESSENTIAL HYPERTENSION: Primary | ICD-10-CM

## 2023-01-02 LAB
ALBUMIN SERPL-MCNC: 3.8 G/DL (ref 3.4–5)
ALP SERPL-CCNC: 117 U/L (ref 40–150)
ALT SERPL W P-5'-P-CCNC: 50 U/L (ref 0–70)
ANION GAP SERPL CALCULATED.3IONS-SCNC: <1 MMOL/L (ref 3–14)
AST SERPL W P-5'-P-CCNC: 20 U/L (ref 0–45)
BILIRUB SERPL-MCNC: 0.3 MG/DL (ref 0.2–1.3)
BUN SERPL-MCNC: 20 MG/DL (ref 7–30)
CALCIUM SERPL-MCNC: 9.1 MG/DL (ref 8.5–10.1)
CHLORIDE BLD-SCNC: 108 MMOL/L (ref 94–109)
CO2 SERPL-SCNC: 33 MMOL/L (ref 20–32)
CREAT SERPL-MCNC: 1.06 MG/DL (ref 0.52–1.25)
ERYTHROCYTE [DISTWIDTH] IN BLOOD BY AUTOMATED COUNT: 13.6 % (ref 10–15)
GFR SERPL CREATININE-BSD FRML MDRD: 88 ML/MIN/1.73M2
GLUCOSE BLD-MCNC: 115 MG/DL (ref 70–99)
HBA1C MFR BLD: 6.3 % (ref 0–5.6)
HCT VFR BLD AUTO: 47.1 % (ref 35–53)
HCV AB SERPL QL IA: NONREACTIVE
HGB BLD-MCNC: 15.3 G/DL (ref 11.7–17.7)
HIV 1+2 AB+HIV1 P24 AG SERPL QL IA: NONREACTIVE
MCH RBC QN AUTO: 28.5 PG (ref 26.5–33)
MCHC RBC AUTO-ENTMCNC: 32.5 G/DL (ref 31.5–36.5)
MCV RBC AUTO: 88 FL (ref 78–100)
PLATELET # BLD AUTO: 240 10E3/UL (ref 150–450)
POTASSIUM BLD-SCNC: 4.2 MMOL/L (ref 3.4–5.3)
PROT SERPL-MCNC: 7.6 G/DL (ref 6.8–8.8)
RBC # BLD AUTO: 5.36 10E6/UL (ref 3.8–5.9)
SODIUM SERPL-SCNC: 141 MMOL/L (ref 133–144)
WBC # BLD AUTO: 6.2 10E3/UL (ref 4–11)

## 2023-01-02 PROCEDURE — 83036 HEMOGLOBIN GLYCOSYLATED A1C: CPT | Performed by: PHYSICIAN ASSISTANT

## 2023-01-02 PROCEDURE — 90471 IMMUNIZATION ADMIN: CPT | Performed by: PHYSICIAN ASSISTANT

## 2023-01-02 PROCEDURE — 85027 COMPLETE CBC AUTOMATED: CPT | Performed by: PHYSICIAN ASSISTANT

## 2023-01-02 PROCEDURE — 86803 HEPATITIS C AB TEST: CPT | Performed by: PHYSICIAN ASSISTANT

## 2023-01-02 PROCEDURE — 87389 HIV-1 AG W/HIV-1&-2 AB AG IA: CPT | Performed by: PHYSICIAN ASSISTANT

## 2023-01-02 PROCEDURE — 93000 ELECTROCARDIOGRAM COMPLETE: CPT | Performed by: PHYSICIAN ASSISTANT

## 2023-01-02 PROCEDURE — 36415 COLL VENOUS BLD VENIPUNCTURE: CPT | Performed by: PHYSICIAN ASSISTANT

## 2023-01-02 PROCEDURE — 90715 TDAP VACCINE 7 YRS/> IM: CPT | Performed by: PHYSICIAN ASSISTANT

## 2023-01-02 PROCEDURE — 80053 COMPREHEN METABOLIC PANEL: CPT | Performed by: PHYSICIAN ASSISTANT

## 2023-01-02 PROCEDURE — 99214 OFFICE O/P EST MOD 30 MIN: CPT | Mod: 25 | Performed by: PHYSICIAN ASSISTANT

## 2023-01-02 PROCEDURE — 71046 X-RAY EXAM CHEST 2 VIEWS: CPT | Mod: TC | Performed by: RADIOLOGY

## 2023-01-02 RX ORDER — LISINOPRIL 20 MG/1
20 TABLET ORAL DAILY
Qty: 30 TABLET | Refills: 1 | Status: SHIPPED | OUTPATIENT
Start: 2023-01-02 | End: 2023-03-15 | Stop reason: DRUGHIGH

## 2023-01-02 ASSESSMENT — PAIN SCALES - GENERAL: PAINLEVEL: NO PAIN (0)

## 2023-01-02 NOTE — PROGRESS NOTES
Assessment & Plan     Essential hypertension  Not to goal, will increase lisinopril to 20 mg, needs recheck in 1 to 2 weeks with float nurse in [place].  Weight loss would dramatically improve his hypertensive control as we are treating his known sleep apnea.  - Comprehensive metabolic panel; Future  - lisinopril (ZESTRIL) 20 MG tablet; Take 1 tablet (20 mg) by mouth daily  - Comprehensive Weight Management; Future  - Comprehensive metabolic panel    Morbid obesity (H)  Will refer to weight loss clinic, weight loss would dramatically help to improve his hypertensive control and his sleep apnea.  I suspect his dyspnea on exertion is also related to his morbid obesity.  - Comprehensive Weight Management; Future    AILEEN (obstructive sleep apnea)  Currently untreated, cannot tolerate his mask, will refer back to sleep medicine for other treatment options  - Adult Sleep Eval & Management  Referral; Future  - Comprehensive Weight Management; Future    WARNER (dyspnea on exertion)  Chest x-ray and EKG are normal, I asked patient to consider stress testing as our next step in work-up.  Weight loss would also help to improve his dyspnea on exertion.  - EKG 12-lead complete w/read - Clinics  - XR Chest 2 Views; Future    Screen for colon cancer  Patient would like to hold off on this, he is aware colon screening is now recommended at age 45    Screening for deficiency anemia    - CBC with platelets; Future  - CBC with platelets    Screening for HIV (human immunodeficiency virus)    - HIV Antigen Antibody Combo; Future  - HIV Antigen Antibody Combo    Need for hepatitis C screening test    - Hepatitis C Screen Reflex to HCV RNA Quant and Genotype; Future  - Hepatitis C Screen Reflex to HCV RNA Quant and Genotype    Need for vaccination  Declined donation for flu and COVID today  - TDAP VACCINE (Adacel, Boostrix)       BMI:   Estimated body mass index is 45.16 kg/m  as calculated from the following:    Height as of  "this encounter: 1.726 m (5' 7.95\").    Weight as of this encounter: 134.5 kg (296 lb 9.6 oz).   Weight management plan: Patient referred to endocrine and/or weight management specialty        Return in about 1 week (around 1/9/2023) for BP Recheck.    YUNIER Estrada [facility] GARRETT Blackburn is a 47 year old, presenting for the following health issues:  Hypertension      History of Present Illness       Hypertension: He presents for follow up of hypertension.  He does not check blood pressure  regularly outside of the clinic. Outside blood pressures have been over 140/90. He follows a low salt diet.       Discuss sleep apnea machine problems, he was diagnosed with sleep apnea about 1 year ago.  He states he cannot tolerate either of the masks that were given to him.  He feels like he is going to die and gets very panicky.  He has gained weight unfortunately and feels that his sleep apnea is worsened.  He has gained approximately 20 pounds in 1 year.  He has been trying to lose weight but admits he likely eats too much.  He states he grew up having to clean his plate and feels that that has helped him to develop poor eating habits.  He states he also really likes sugary snacks.      Concern - Weight and shortness of breath   Onset: abount a year   Description: becomes winded easily, he reports he is active throughout the day.  He owns an autobody shop.  But has found in the last year he just generally becomes more winded.  Intensity: severe, symptoms resolved with 3 to 4 minutes of rest.  He has no associated chest pain.  He may become lightheaded at times and wheeze also.  He does not smoke and never helps.  Progression of Symptoms:  same  Accompanying Signs & Symptoms: some light headedness   Previous history of similar problem: no  Precipitating factors:        Worsened by: none   Alleviating factors:        Improved by: none  Therapies tried and outcome: None        Review of " "Systems   Constitutional, HEENT, cardiovascular, pulmonary, gi and gu systems are negative, except as otherwise noted.      Objective    BP (!) 152/102   Pulse 74   Temp 97.9  F (36.6  C) (Temporal)   Ht 1.726 m (5' 7.95\")   Wt 134.5 kg (296 lb 9.6 oz)   LMP  (Exact Date)   SpO2 94%   Breastfeeding No   BMI 45.16 kg/m    Body mass index is 45.16 kg/m .  Physical Exam   GENERAL: healthy, alert and no distress  HENT: ear canals and TM's normal, nose and mouth without ulcers or lesions  NECK: no adenopathy, no asymmetry, masses, or scars and thyroid normal to palpation  RESP: lungs clear to auscultation - no rales, rhonchi or wheezes  CV: regular rate and rhythm, normal S1 S2, no S3 or S4, no murmur, click or rub, no peripheral edema and peripheral pulses strong  ABDOMEN: soft, nontender, no hepatosplenomegaly, no masses and bowel sounds normal  MS: no gross musculoskeletal defects noted, no edema  NEURO: Normal strength and tone, mentation intact and speech normal  PSYCH: mentation appears normal, affect normal/bright    CXR - Reviewed and interpreted by me Normal- no infiltrates, effusions, pneumothoraces, cardiomegaly or masses  EKG - Reviewed and interpreted by me appears normal, NSR, normal axis, normal intervals, no acute ST/T changes c/w ischemia, no LVH by voltage criteria, unchanged from previous tracings  Results for orders placed or performed in visit on 01/02/23   XR Chest 2 Views     Status: None    Narrative    XR CHEST 2 VIEWS 1/2/2023 8:02 AM    HISTORY: WARNER (dyspnea on exertion)    COMPARISON: X-ray 11/28/2006      Impression    IMPRESSION: Mild asymmetric elevation of the the right hemidiaphragm.  No acute findings. The lungs are clear and there are no pleural  effusions. Normal heart size.    MICHELLE YOUNG MD         SYSTEM ID:  [id number]   Results for orders placed or performed in visit on 01/02/23   CBC with platelets     Status: Normal   Result Value Ref Range    WBC Count 6.2 4.0 - " 11.0 10e3/uL    RBC Count 5.36 3.80 - 5.90 10e6/uL    Hemoglobin 15.3 11.7 - 17.7 g/dL    Hematocrit 47.1 35.0 - 53.0 %    MCV 88 78 - 100 fL    MCH 28.5 26.5 - 33.0 pg    MCHC 32.5 31.5 - 36.5 g/dL    RDW 13.6 10.0 - 15.0 %    Platelet Count 240 150 - 450 10e3/uL    Narrative    The sex of this patient cannot be reliably determined based on discrepancies in demographics (legal sex, sex assigned at birth, gender identity).  Both male and female reference ranges are provided where applicable.  Careful evaluation of the patient s results as compared to the gender specific reference intervals is required in this setting.

## 2023-03-13 DIAGNOSIS — I10 ESSENTIAL HYPERTENSION: ICD-10-CM

## 2023-03-15 ENCOUNTER — ALLIED HEALTH/NURSE VISIT (OUTPATIENT)
Dept: FAMILY MEDICINE | Facility: CLINIC | Age: 48
End: 2023-03-15
Payer: COMMERCIAL

## 2023-03-15 VITALS — SYSTOLIC BLOOD PRESSURE: 142 MMHG | DIASTOLIC BLOOD PRESSURE: 94 MMHG

## 2023-03-15 DIAGNOSIS — I10 ESSENTIAL HYPERTENSION: Primary | ICD-10-CM

## 2023-03-15 PROCEDURE — 99207 PR NO CHARGE NURSE ONLY: CPT | Performed by: PHYSICIAN ASSISTANT

## 2023-03-15 RX ORDER — LISINOPRIL 20 MG/1
20 TABLET ORAL DAILY
Qty: 30 TABLET | Refills: 1 | Status: CANCELLED | OUTPATIENT
Start: 2023-03-15

## 2023-03-15 RX ORDER — LISINOPRIL 30 MG/1
30 TABLET ORAL DAILY
Qty: 30 TABLET | Refills: 0 | Status: SHIPPED | OUTPATIENT
Start: 2023-03-15 | End: 2023-03-27

## 2023-03-15 NOTE — PROGRESS NOTES
Alexey Underwood was evaluated at [facility] on March 15, 2023 at which time his blood pressure was:    BP Readings from Last 1 Encounters:   03/15/23 (!) 142/94     No data recorded      Reviewed lifestyle modifications for blood pressure control and reduction: including making healthy food choices, managing weight, getting regular exercise, smoking cessation, reducing alcohol consumption, monitoring blood pressure regularly.     Symptoms: None    BP Goal:< 140/90 mmHg    BP Assessment:  BP too high    Potential Reasons for BP too high: Unknown/Other: Unknown    BP Follow-Up Plan: Referral to PCP    Recommendation to Provider: Patient's lisinopril dose was increased to 20mg back in the beginning of Jan 2023 but reading is still a little elevated.  Patient said he did take his medication today.  Consider further dose increase?    Note completed by: Evangelist Tiwari RPH

## 2023-03-15 NOTE — TELEPHONE ENCOUNTER
Patient was seen for BP check by pharmacist- see [facility]/Nurse Visit 3/15/2023.     BP still elevate.    Routing refill request to provider for review/approval because:   Labs out of range:   BP Readings from Last 3 Encounters:   03/15/23 (!) 142/94   01/02/23 (!) 152/102   03/07/22 135/87       ARUN MarquezN, RN  [facility] ~ Registered Nurse  Clinic Triage ~ [place] & [place]  March 15, 2023

## 2023-03-15 NOTE — TELEPHONE ENCOUNTER
Please call pt I have increased his lisinopril to 30mg daily, I would like to see him again in the office for a recheck. We may need to add an addition med if this is not working.  Please help him set up crow in 2 weeks or so, ok to use same day/ blocked/ virtual appointments Monday - weds  Es Mccoy PA-C

## 2023-03-16 NOTE — TELEPHONE ENCOUNTER
RN contacted patient to discuss. Patient scheduled.     Next 5 appointments (look out 90 days)    Mar 27, 2023 11:30 AM  (Arrive by 11:10 AM)  Provider Visit with Es Mccoy PA-C  [facility] ([facility] ) [id number], [address]  [place] [address]  [phone]        YULISSA Colon, RN, PHN  [place]  March 16, 2023

## 2023-03-27 ENCOUNTER — OFFICE VISIT (OUTPATIENT)
Dept: FAMILY MEDICINE | Facility: CLINIC | Age: 48
End: 2023-03-27
Payer: COMMERCIAL

## 2023-03-27 VITALS
WEIGHT: 296.9 LBS | HEART RATE: 72 BPM | DIASTOLIC BLOOD PRESSURE: 92 MMHG | HEIGHT: 69 IN | TEMPERATURE: 98.8 F | SYSTOLIC BLOOD PRESSURE: 142 MMHG | BODY MASS INDEX: 43.97 KG/M2 | OXYGEN SATURATION: 96 %

## 2023-03-27 DIAGNOSIS — Z13.6 CARDIOVASCULAR SCREENING; LDL GOAL LESS THAN 160: ICD-10-CM

## 2023-03-27 DIAGNOSIS — I10 ESSENTIAL HYPERTENSION: Primary | ICD-10-CM

## 2023-03-27 DIAGNOSIS — Z12.11 SCREEN FOR COLON CANCER: ICD-10-CM

## 2023-03-27 DIAGNOSIS — G47.33 OSA (OBSTRUCTIVE SLEEP APNEA): ICD-10-CM

## 2023-03-27 LAB
CHOLEST SERPL-MCNC: 147 MG/DL
FASTING STATUS PATIENT QL REPORTED: YES
HDLC SERPL-MCNC: 46 MG/DL
LDLC SERPL CALC-MCNC: 85 MG/DL
NONHDLC SERPL-MCNC: 101 MG/DL
POTASSIUM BLD-SCNC: 3.8 MMOL/L (ref 3.4–5.3)
TRIGL SERPL-MCNC: 78 MG/DL

## 2023-03-27 PROCEDURE — 84132 ASSAY OF SERUM POTASSIUM: CPT | Performed by: PHYSICIAN ASSISTANT

## 2023-03-27 PROCEDURE — 80061 LIPID PANEL: CPT | Performed by: PHYSICIAN ASSISTANT

## 2023-03-27 PROCEDURE — 99213 OFFICE O/P EST LOW 20 MIN: CPT | Performed by: PHYSICIAN ASSISTANT

## 2023-03-27 PROCEDURE — 36415 COLL VENOUS BLD VENIPUNCTURE: CPT | Performed by: PHYSICIAN ASSISTANT

## 2023-03-27 RX ORDER — HYDROCHLOROTHIAZIDE 25 MG/1
25 TABLET ORAL DAILY
Qty: 30 TABLET | Refills: 1 | Status: SHIPPED | OUTPATIENT
Start: 2023-03-27 | End: 2023-06-05

## 2023-03-27 RX ORDER — LISINOPRIL 30 MG/1
30 TABLET ORAL DAILY
Qty: 30 TABLET | Refills: 1 | Status: SHIPPED | OUTPATIENT
Start: 2023-03-27 | End: 2023-06-05

## 2023-03-27 ASSESSMENT — PAIN SCALES - GENERAL: PAINLEVEL: NO PAIN (0)

## 2023-03-27 NOTE — PROGRESS NOTES
Assessment & Plan     Essential hypertension  Not to goal, adding hctz  - lisinopril (ZESTRIL) 30 MG tablet; Take 1 tablet (30 mg) by mouth daily  - Potassium; Future  - hydrochlorothiazide (HYDRODIURIL) 25 MG tablet; Take 1 tablet (25 mg) by mouth daily  - Potassium  We will recheck blood pressure and potassium again in 2 weeks    AILEEN (obstructive sleep apnea)  He has not followed through on scheduling with sleep medicine, untreated sleep apnea is likely making it more difficult for us to manage his hypertension.    Screen for colon cancer  He does not have time to address both the colonoscopy and the sleep study at this time, though colon cancer screening is very important we will hold off on this until he can address his sleep apnea    CARDIOVASCULAR SCREENING; LDL GOAL LESS THAN 160    - Lipid panel reflex to direct LDL Fasting        Es Mccoy PA-C  [facility] GARRETT Blackburn is a 47 year old, presenting for the following health issues:  Hypertension and Recheck Medication    Additional Questions 3/27/2023   Roomed by CG   Accompanied by none     History of Present Illness       Hypertension: He presents for follow up of hypertension.  He does not check blood pressure  regularly outside of the clinic. Outpatient blood pressures have not been over 140/90. He follows a low salt diet.     He eats 0-1 servings of fruits and vegetables daily.He consumes 1 sweetened beverage(s) daily.He exercises with enough effort to increase his heart rate 10 to 19 minutes per day.  He exercises with enough effort to increase his heart rate 7 days per week.   He is taking medications regularly.       Medication Followup of Lisinopril     Taking Medication as prescribed: yes    Side Effects:  None    Medication Helping Symptoms:  yes    He did not notice any benefit with his blood pressure and going from 20 to 30 mg of lisinopril.  He has not had any side effects or issues with its use.  He does  "not drink alcohol, he is not lost weight though he is avoiding adding salt.  He tries to limit fast foods.  He has not had time to schedule his sleep study.      Review of Systems   Constitutional, HEENT, cardiovascular, pulmonary, gi and gu systems are negative, except as otherwise noted.      Objective    BP (!) 142/92 (BP Location: Left arm, Patient Position: Chair, Cuff Size: Adult Large)   Pulse 72   Temp 98.8  F (37.1  C) (Temporal)   Ht 1.745 m (5' 8.7\")   Wt 134.7 kg (296 lb 14.4 oz)   LMP  (LMP Unknown)   SpO2 96%   BMI 44.23 kg/m    Body mass index is 44.23 kg/m .  Physical Exam   GENERAL: healthy, alert and no distress  NECK: no adenopathy, no asymmetry, masses, or scars and thyroid normal to palpation  RESP: lungs clear to auscultation - no rales, rhonchi or wheezes  CV: regular rate and rhythm, normal S1 S2, no S3 or S4, no murmur, click or rub, no peripheral edema and peripheral pulses strong  MS: no gross musculoskeletal defects noted, no edema    No results found for any visits on 03/27/23.                "

## 2023-04-17 ENCOUNTER — TELEPHONE (OUTPATIENT)
Dept: FAMILY MEDICINE | Facility: CLINIC | Age: 48
End: 2023-04-17

## 2023-04-17 ENCOUNTER — LAB (OUTPATIENT)
Dept: LAB | Facility: OTHER | Age: 48
End: 2023-04-17
Payer: COMMERCIAL

## 2023-04-17 ENCOUNTER — ALLIED HEALTH/NURSE VISIT (OUTPATIENT)
Dept: FAMILY MEDICINE | Facility: OTHER | Age: 48
End: 2023-04-17
Payer: COMMERCIAL

## 2023-04-17 VITALS — SYSTOLIC BLOOD PRESSURE: 138 MMHG | DIASTOLIC BLOOD PRESSURE: 88 MMHG

## 2023-04-17 DIAGNOSIS — I10 ESSENTIAL HYPERTENSION: ICD-10-CM

## 2023-04-17 DIAGNOSIS — Z01.30 BLOOD PRESSURE CHECK: Primary | ICD-10-CM

## 2023-04-17 LAB — POTASSIUM SERPL-SCNC: 3.7 MMOL/L (ref 3.4–5.3)

## 2023-04-17 PROCEDURE — 84132 ASSAY OF SERUM POTASSIUM: CPT

## 2023-04-17 PROCEDURE — 99207 PR NO CHARGE NURSE ONLY: CPT

## 2023-04-17 PROCEDURE — 36415 COLL VENOUS BLD VENIPUNCTURE: CPT

## 2023-04-17 NOTE — TELEPHONE ENCOUNTER
Alexey Underwood is a 47 year old patient who comes in today for a Blood Pressure check with a Medical Assistant because of ongoing blood pressure monitoring.  Initial BP:  BP (!) 140/90   LMP  (LMP Unknown)       Blood pressure is high.   Repeat Blood pressure: 138/88  Patient is taking medication as prescribed.  Patient is tolerating medications well.  Patient is not monitoring Blood Pressure at home.  If yes- average readings    Current Symptoms: none    Disposition:   Abnormal Blood Pressure yes  Yes: Huddled with: Jamel le  due to abnormal result. Create a telephone encounter copy this note and route to requesting provider/PCP.  No: CC this chart to requesting provider/PCP.

## 2023-04-17 NOTE — TELEPHONE ENCOUNTER
Please call patient, I will get back to him with the results of his potassium when it has been completed.  Lets check it once more in 2 weeks with float nurse or with office visit with me, have him continue to reduce his sodium in his diet and attempt weight loss.  If his blood pressure is still high at his next recheck will may consider additional medications.  If his blood pressure is still high, please help him schedule a phone visit to discuss.  Es Mccoy PA-C

## 2023-04-18 NOTE — TELEPHONE ENCOUNTER
Patient was informed.   ARUN ColonN, RN, PHN  [place]  April 18, 2023

## 2023-05-02 ENCOUNTER — ALLIED HEALTH/NURSE VISIT (OUTPATIENT)
Dept: FAMILY MEDICINE | Facility: OTHER | Age: 48
End: 2023-05-02
Payer: COMMERCIAL

## 2023-05-02 VITALS — SYSTOLIC BLOOD PRESSURE: 138 MMHG | DIASTOLIC BLOOD PRESSURE: 88 MMHG

## 2023-05-02 DIAGNOSIS — Z01.30 BP CHECK: Primary | ICD-10-CM

## 2023-05-02 PROCEDURE — 99207 PR NO CHARGE NURSE ONLY: CPT

## 2023-05-02 NOTE — PROGRESS NOTES
Alexey Underwood is a 47 year old patient who comes in today for a Blood Pressure check with a Medical Assistant because of medication change and ongoing blood pressure monitoring.  Initial BP:  /88   LMP  (LMP Unknown)      Blood pressure is high.   Repeat Blood pressure: YES, lowered  Patient is taking medication as prescribed.  Patient is tolerating medications well.  Is patient taking blood pressures at home? No  Current Symptoms: none    Disposition:   Abnormal Blood Pressure yes  Yes: Huddled with:  due to abnormal result. Create a telephone encounter copy this note and route to requesting provider/PCP.  No: CC this chart to requesting provider/PCP.    Shania Osborne MA

## 2023-06-05 DIAGNOSIS — I10 ESSENTIAL HYPERTENSION: ICD-10-CM

## 2023-06-05 RX ORDER — LISINOPRIL 30 MG/1
TABLET ORAL
Qty: 30 TABLET | Refills: 1 | Status: SHIPPED | OUTPATIENT
Start: 2023-06-05 | End: 2023-08-03

## 2023-06-05 RX ORDER — HYDROCHLOROTHIAZIDE 25 MG/1
TABLET ORAL
Qty: 30 TABLET | Refills: 1 | Status: SHIPPED | OUTPATIENT
Start: 2023-06-05 | End: 2023-08-07

## 2023-08-03 DIAGNOSIS — I10 ESSENTIAL HYPERTENSION: ICD-10-CM

## 2023-08-03 RX ORDER — LISINOPRIL 30 MG/1
TABLET ORAL
Qty: 90 TABLET | Refills: 0 | Status: SHIPPED | OUTPATIENT
Start: 2023-08-03 | End: 2023-11-02

## 2023-08-06 DIAGNOSIS — I10 ESSENTIAL HYPERTENSION: ICD-10-CM

## 2023-08-07 RX ORDER — HYDROCHLOROTHIAZIDE 25 MG/1
TABLET ORAL
Qty: 30 TABLET | Refills: 3 | Status: SHIPPED | OUTPATIENT
Start: 2023-08-07 | End: 2023-12-11

## 2023-12-01 ENCOUNTER — ALLIED HEALTH/NURSE VISIT (OUTPATIENT)
Dept: FAMILY MEDICINE | Facility: CLINIC | Age: 48
End: 2023-12-01
Payer: COMMERCIAL

## 2023-12-01 VITALS — SYSTOLIC BLOOD PRESSURE: 130 MMHG | DIASTOLIC BLOOD PRESSURE: 88 MMHG

## 2023-12-01 DIAGNOSIS — I10 ESSENTIAL HYPERTENSION: Primary | Chronic | ICD-10-CM

## 2023-12-01 PROCEDURE — 99207 PR NO CHARGE NURSE ONLY: CPT | Performed by: PHYSICIAN ASSISTANT

## 2023-12-01 NOTE — PROGRESS NOTES
Alexey Underwood was evaluated at [facility] on December 1, 2023 at which time his blood pressure was:    BP Readings from Last 1 Encounters:   12/01/23 130/88     No data recorded      Reviewed lifestyle modifications for blood pressure control and reduction: including making healthy food choices, managing weight, getting regular exercise, smoking cessation, reducing alcohol consumption, monitoring blood pressure regularly.     Symptoms: None    BP Goal:< 140/90 mmHg    BP Assessment:  BP at goal    Potential Reasons for BP too high: NA - Not applicable    BP Follow-Up Plan: Recheck BP in 6 months at pharmacy    Recommendation to Provider: none    Note completed by:  -Danielle Andre, Pharm.D., [facility] [place], [phone]

## 2023-12-10 DIAGNOSIS — I10 ESSENTIAL HYPERTENSION: ICD-10-CM

## 2023-12-11 RX ORDER — HYDROCHLOROTHIAZIDE 25 MG/1
TABLET ORAL
Qty: 30 TABLET | Refills: 2 | Status: SHIPPED | OUTPATIENT
Start: 2023-12-11 | End: 2024-02-26

## 2024-02-08 ENCOUNTER — TELEPHONE (OUTPATIENT)
Dept: FAMILY MEDICINE | Facility: CLINIC | Age: 49
End: 2024-02-08
Payer: COMMERCIAL

## 2024-02-08 DIAGNOSIS — I10 ESSENTIAL HYPERTENSION: ICD-10-CM

## 2024-02-08 RX ORDER — LISINOPRIL 30 MG/1
TABLET ORAL
Qty: 90 TABLET | Refills: 0 | Status: SHIPPED | OUTPATIENT
Start: 2024-02-08 | End: 2024-02-26 | Stop reason: DRUGHIGH

## 2024-02-26 ENCOUNTER — OFFICE VISIT (OUTPATIENT)
Dept: FAMILY MEDICINE | Facility: CLINIC | Age: 49
End: 2024-02-26
Payer: COMMERCIAL

## 2024-02-26 VITALS
OXYGEN SATURATION: 96 % | WEIGHT: 299.5 LBS | TEMPERATURE: 97.6 F | DIASTOLIC BLOOD PRESSURE: 86 MMHG | HEART RATE: 82 BPM | SYSTOLIC BLOOD PRESSURE: 134 MMHG | RESPIRATION RATE: 21 BRPM | BODY MASS INDEX: 45.39 KG/M2 | HEIGHT: 68 IN

## 2024-02-26 DIAGNOSIS — Z13.0 SCREENING FOR DEFICIENCY ANEMIA: ICD-10-CM

## 2024-02-26 DIAGNOSIS — I10 ESSENTIAL HYPERTENSION: ICD-10-CM

## 2024-02-26 DIAGNOSIS — G47.33 OSA (OBSTRUCTIVE SLEEP APNEA): ICD-10-CM

## 2024-02-26 DIAGNOSIS — Z00.01 ENCOUNTER FOR ROUTINE ADULT MEDICAL EXAM WITH ABNORMAL FINDINGS: Primary | ICD-10-CM

## 2024-02-26 DIAGNOSIS — E66.01 MORBID OBESITY (H): ICD-10-CM

## 2024-02-26 DIAGNOSIS — Z13.1 SCREENING FOR DIABETES MELLITUS: ICD-10-CM

## 2024-02-26 DIAGNOSIS — Z13.6 CARDIOVASCULAR SCREENING; LDL GOAL LESS THAN 160: ICD-10-CM

## 2024-02-26 DIAGNOSIS — Z12.11 SCREEN FOR COLON CANCER: ICD-10-CM

## 2024-02-26 LAB
ALBUMIN SERPL BCG-MCNC: 4.2 G/DL (ref 3.5–5.2)
ALP SERPL-CCNC: 89 U/L (ref 40–150)
ALT SERPL W P-5'-P-CCNC: 57 U/L (ref 0–70)
ANION GAP SERPL CALCULATED.3IONS-SCNC: 12 MMOL/L (ref 7–15)
AST SERPL W P-5'-P-CCNC: 31 U/L (ref 0–45)
BILIRUB SERPL-MCNC: 0.3 MG/DL
BUN SERPL-MCNC: 18.7 MG/DL (ref 6–20)
CALCIUM SERPL-MCNC: 9.3 MG/DL (ref 8.6–10)
CHLORIDE SERPL-SCNC: 101 MMOL/L (ref 98–107)
CHOLEST SERPL-MCNC: 140 MG/DL
CREAT SERPL-MCNC: 0.71 MG/DL (ref 0.51–1.17)
DEPRECATED HCO3 PLAS-SCNC: 26 MMOL/L (ref 22–29)
EGFRCR SERPLBLD CKD-EPI 2021: >90 ML/MIN/1.73M2
ERYTHROCYTE [DISTWIDTH] IN BLOOD BY AUTOMATED COUNT: 13.7 % (ref 10–15)
FASTING STATUS PATIENT QL REPORTED: NO
GLUCOSE SERPL-MCNC: 92 MG/DL (ref 70–99)
HBA1C MFR BLD: 6.1 % (ref 0–5.6)
HCT VFR BLD AUTO: 45.9 % (ref 35–53)
HDLC SERPL-MCNC: 40 MG/DL
HGB BLD-MCNC: 15 G/DL (ref 11.7–17.7)
LDLC SERPL CALC-MCNC: 82 MG/DL
MCH RBC QN AUTO: 28.7 PG (ref 26.5–33)
MCHC RBC AUTO-ENTMCNC: 32.7 G/DL (ref 31.5–36.5)
MCV RBC AUTO: 88 FL (ref 78–100)
NONHDLC SERPL-MCNC: 100 MG/DL
PLATELET # BLD AUTO: 234 10E3/UL (ref 150–450)
POTASSIUM SERPL-SCNC: 3.6 MMOL/L (ref 3.4–5.3)
PROT SERPL-MCNC: 7.5 G/DL (ref 6.4–8.3)
RBC # BLD AUTO: 5.23 10E6/UL (ref 3.8–5.9)
SODIUM SERPL-SCNC: 139 MMOL/L (ref 135–145)
TRIGL SERPL-MCNC: 91 MG/DL
WBC # BLD AUTO: 7.2 10E3/UL (ref 4–11)

## 2024-02-26 PROCEDURE — 36415 COLL VENOUS BLD VENIPUNCTURE: CPT | Performed by: PHYSICIAN ASSISTANT

## 2024-02-26 PROCEDURE — 85027 COMPLETE CBC AUTOMATED: CPT | Performed by: PHYSICIAN ASSISTANT

## 2024-02-26 PROCEDURE — 80053 COMPREHEN METABOLIC PANEL: CPT | Performed by: PHYSICIAN ASSISTANT

## 2024-02-26 PROCEDURE — 99396 PREV VISIT EST AGE 40-64: CPT | Mod: 25 | Performed by: PHYSICIAN ASSISTANT

## 2024-02-26 PROCEDURE — 83036 HEMOGLOBIN GLYCOSYLATED A1C: CPT | Performed by: PHYSICIAN ASSISTANT

## 2024-02-26 PROCEDURE — 80061 LIPID PANEL: CPT | Performed by: PHYSICIAN ASSISTANT

## 2024-02-26 PROCEDURE — 99214 OFFICE O/P EST MOD 30 MIN: CPT | Mod: 25 | Performed by: PHYSICIAN ASSISTANT

## 2024-02-26 RX ORDER — HYDROCHLOROTHIAZIDE 25 MG/1
25 TABLET ORAL DAILY
Qty: 90 TABLET | Refills: 1 | Status: SHIPPED | OUTPATIENT
Start: 2024-02-26 | End: 2024-09-12

## 2024-02-26 RX ORDER — LISINOPRIL 40 MG/1
40 TABLET ORAL DAILY
Qty: 90 TABLET | Refills: 1 | Status: SHIPPED | OUTPATIENT
Start: 2024-02-26 | End: 2024-09-12

## 2024-02-26 SDOH — HEALTH STABILITY: PHYSICAL HEALTH: ON AVERAGE, HOW MANY DAYS PER WEEK DO YOU ENGAGE IN MODERATE TO STRENUOUS EXERCISE (LIKE A BRISK WALK)?: 5 DAYS

## 2024-02-26 SDOH — HEALTH STABILITY: PHYSICAL HEALTH: ON AVERAGE, HOW MANY MINUTES DO YOU ENGAGE IN EXERCISE AT THIS LEVEL?: 20 MIN

## 2024-02-26 ASSESSMENT — SOCIAL DETERMINANTS OF HEALTH (SDOH): HOW OFTEN DO YOU GET TOGETHER WITH FRIENDS OR RELATIVES?: MORE THAN THREE TIMES A WEEK

## 2024-02-26 ASSESSMENT — PAIN SCALES - GENERAL: PAINLEVEL: NO PAIN (0)

## 2024-02-26 NOTE — PROGRESS NOTES
Preventive Care Visit  [facility] GARRETT Mccoy PA-C, Family Medicine  Feb 26, 2024    Assessment & Plan     Encounter for routine adult medical exam with abnormal findings  Recommend routine annual visit    Screen for colon cancer  Will be called to schedule  - Colonoscopy Screening  Referral; Future    Morbid obesity (H)  He would be willing to consider bariatric surgery though is not certain if he wants to do weight loss meds initially, weight loss would benefit him greatly, would improve his hypertensive control, improve his sleep apnea and reduce his risk for the progression onto diabetes  - Adult Comprehensive Weight Management  Referral; Future    Essential hypertension  Initial blood pressure elevated recommend blood pressure to goal, we did bump his lisinopril to 40 mg.  Certainly a portion of the poor blood pressure control could be related to untreated sleep apnea and obesity  - Comprehensive metabolic panel; Future  - lisinopril (ZESTRIL) 40 MG tablet; Take 1 tablet (40 mg) by mouth daily  - hydrochlorothiazide (HYDRODIURIL) 25 MG tablet; Take 1 tablet (25 mg) by mouth daily  - Comprehensive metabolic panel    Screening for deficiency anemia    - CBC with platelets; Future  - CBC with platelets    CARDIOVASCULAR SCREENING; LDL GOAL LESS THAN 160    - Lipid panel reflex to direct LDL Non-fasting; Future  - Lipid panel reflex to direct LDL Non-fasting    Screening for diabetes mellitus    - Hemoglobin A1c; Future  - Hemoglobin A1c    AILEEN (obstructive sleep apnea)  Patient needs to be reevaluated he is not tolerating his current CPAP supplies  - Adult Sleep Eval & Management  Referral; Future    This chart documentation was completed in part with Dragon voice recognition software.  Documentation is reviewed after completion, however, some words and grammatical errors may remain.  Es Mccoy PA-C        BMI  Estimated body mass index is 45.54 kg/m  as  "calculated from the following:    Height as of this encounter: 1.727 m (5' 8\").    Weight as of this encounter: 135.9 kg (299 lb 8 oz).   Weight management plan: Patient referred to endocrine and/or weight management specialty Discussed healthy diet and exercise guidelines    Counseling  Appropriate preventive services were discussed with this patient, including applicable screening as appropriate for fall prevention, nutrition, physical activity, Tobacco-use cessation, weight loss and cognition.  Checklist reviewing preventive services available has been given to the patient.  Reviewed patient's diet, addressing concerns and/or questions.   The patient was instructed to see the dentist every 6 months.       This chart documentation was completed in part with Dragon voice recognition software.  Documentation is reviewed after completion, however, some words and grammatical errors may remain.  YUNIER Estrada is a 48 year old, presenting for the following:  Physical        2/26/2024     1:22 PM   Additional Questions   Roomed by VE        Health Care Directive  Patient does not have a Health Care Directive or Living Will: Discussed advance care planning with patient; however, patient declined at this time.    HPI      Hypertension Follow-up    Do you check your blood pressure regularly outside of the clinic? No   Are you following a low salt diet? No  Are your blood pressures ever more than 140 on the top number (systolic) OR more   than 90 on the bottom number (diastolic), for example 140/90?  Blood pressures are typically high initially in the clinic he is taking hydrochlorothiazide and lisinopril 30 mg.      2/26/2024   General Health   How would you rate your overall physical health? Good   Feel stress (tense, anxious, or unable to sleep) Not at all         2/26/2024   Nutrition   Three or more servings of calcium each day? Yes   Diet: Low salt   How many servings of fruit and " vegetables per day? (!) 0-1   How many sweetened beverages each day? (!) 2         2/26/2024   Exercise   Days per week of moderate/strenous exercise 5 days   Average minutes spent exercising at this level 20 min         2/26/2024   Social Factors   Frequency of gathering with friends or relatives More than three times a week   Worry food won't last until get money to buy more No   Food not last or not have enough money for food? No   Do you have housing?  Yes   Are you worried about losing your housing? No   Lack of transportation? No   Unable to get utilities (heat,electricity)? No         2/26/2024   Dental   Dentist two times every year? (!) NO         2/26/2024   TB Screening   Were you born outside of [place]?  (!) YES           Today's PHQ-2 Score:       2/26/2024     1:17 PM   PHQ-2 ( 1999 [employer])   Q1: Little interest or pleasure in doing things 0   Q2: Feeling down, depressed or hopeless 0   PHQ-2 Score 0   Q1: Little interest or pleasure in doing things Not at all   Q2: Feeling down, depressed or hopeless Not at all   PHQ-2 Score 0           2/26/2024   Substance Use   Alcohol more than 3/day or more than 7/wk No   Do you use any other substances recreationally? No     Social History     Tobacco Use    Smoking status: Never     Passive exposure: Never    Smokeless tobacco: Never   Vaping Use    Vaping Use: Never used   Substance Use Topics    Alcohol use: Yes     Comment: 1-2 times/month    Drug use: No           2/26/2024   STI Screening   New sexual partner(s) since last STI/HIV test? No   ASCVD Risk   The 10-year ASCVD risk score (Gerard HOBBS, et al., 2019) is: 1.9%    Values used to calculate the score:      Age: 48 years      Sex: Male      Is Non- : No      Diabetic: No      Tobacco smoker: No      Systolic Blood Pressure: 134 mmHg      Is BP treated: Yes      HDL Cholesterol: 46 mg/dL      Total Cholesterol: 147 mg/dL        2/26/2024   Contraception/Family Planning  "  Questions about contraception or family planning No        Reviewed and updated as needed this visit by Provider                    Lab work is in process  Labs reviewed in EPIC  BP Readings from Last 3 Encounters:   02/26/24 134/86   12/01/23 130/88   05/02/23 138/88    Wt Readings from Last 3 Encounters:   02/26/24 135.9 kg (299 lb 8 oz)   03/27/23 134.7 kg (296 lb 14.4 oz)   01/02/23 134.5 kg (296 lb 9.6 oz)                  Patient Active Problem List   Diagnosis    CARDIOVASCULAR SCREENING; LDL GOAL LESS THAN 160    Diverticulosis of colon- has had a few flares of \"-itis\"    Renal colic    Morbid obesity (H)    Essential hypertension    BMI 40.0-44.9, adult (H)     Past Surgical History:   Procedure Laterality Date    NO HISTORY OF SURGERY         Social History     Tobacco Use    Smoking status: Never     Passive exposure: Never    Smokeless tobacco: Never   Substance Use Topics    Alcohol use: Yes     Comment: 1-2 times/month     Family History   Problem Relation Age of Onset    Hypertension Father     Diabetes Maternal Grandmother     Diabetes Maternal Grandfather     Asthma No family hx of     C.A.D. No family hx of     Cerebrovascular Disease No family hx of     Breast Cancer No family hx of     Cancer - colorectal No family hx of     Prostate Cancer No family hx of     Alcohol/Drug No family hx of     Allergies No family hx of     Alzheimer Disease No family hx of     Anesthesia Reaction No family hx of     Arthritis No family hx of     Blood Disease No family hx of     Cancer No family hx of     Cardiovascular No family hx of     Circulatory No family hx of     Connective Tissue Disorder No family hx of     Congenital Anomalies No family hx of     Depression No family hx of     Eye Disorder No family hx of     Genetic Disorder No family hx of     Gastrointestinal Disease No family hx of     Genitourinary Problems No family hx of     Gynecology No family hx of     Heart Disease No family hx of     " "Lipids No family hx of     Musculoskeletal Disorder No family hx of     Neurologic Disorder No family hx of     Obesity No family hx of     Osteoporosis No family hx of     Psychotic Disorder No family hx of     Respiratory No family hx of     Thyroid Disease No family hx of     Hearing Loss No family hx of          Current Outpatient Medications   Medication Sig Dispense Refill    hydrochlorothiazide (HYDRODIURIL) 25 MG tablet Take 1 tablet (25 mg) by mouth daily 90 tablet 1    lisinopril (ZESTRIL) 40 MG tablet Take 1 tablet (40 mg) by mouth daily 90 tablet 1    FIBER PO Take  by mouth. (Patient not taking: Reported on 3/27/2023)      Melatonin 10 MG CAPS Take by mouth as needed (Patient not taking: Reported on 3/27/2023)       Allergies   Allergen Reactions    No Known Allergies          Review of Systems  Constitutional, HEENT, cardiovascular, pulmonary, gi and gu systems are negative, except as otherwise noted.     Objective    Exam  /86 (BP Location: Left arm, Patient Position: Chair, Cuff Size: Adult Large)   Pulse 82   Temp 97.6  F (36.4  C) (Temporal)   Resp 21   Ht 1.727 m (5' 8\")   Wt 135.9 kg (299 lb 8 oz)   LMP  (LMP Unknown)   SpO2 96%   Breastfeeding No   BMI 45.54 kg/m     Estimated body mass index is 45.54 kg/m  as calculated from the following:    Height as of this encounter: 1.727 m (5' 8\").    Weight as of this encounter: 135.9 kg (299 lb 8 oz).    Physical Exam    GENERAL: alert and no distress  EYES: Eyes grossly normal to inspection, PERRL and conjunctivae and sclerae normal  HENT: ear canals and TM's normal, nose and mouth without ulcers or lesions  NECK: no adenopathy, no asymmetry, masses, or scars  RESP: lungs clear to auscultation - no rales, rhonchi or wheezes  CV: regular rate and rhythm, normal S1 S2, no S3 or S4, no murmur, click or rub, no peripheral edema  ABDOMEN: soft, nontender, no hepatosplenomegaly, no masses and bowel sounds normal  MS: no gross musculoskeletal " defects noted, no edema  SKIN: no suspicious lesions or rashes  PSYCH: mentation appears normal, affect normal/bright      Signed Electronically by: Es Mccoy PA-C

## 2024-02-27 ENCOUNTER — TELEPHONE (OUTPATIENT)
Dept: GASTROENTEROLOGY | Facility: CLINIC | Age: 49
End: 2024-02-27
Payer: COMMERCIAL

## 2024-02-27 NOTE — TELEPHONE ENCOUNTER
"Endoscopy Scheduling Screen    Have you had a positive Covid test in the last 14 days?  No    Are you active on MyChart?   No    What insurance is in the chart?  Other:      Ordering/Referring Provider:   SHAISTA THRASHER        (If ordering provider performs procedure, schedule with ordering provider unless otherwise instructed. )    BMI: Estimated body mass index is 45.54 kg/m  as calculated from the following:    Height as of 2/26/24: 1.727 m (5' 8\").    Weight as of 2/26/24: 135.9 kg (299 lb 8 oz).     Sedation Ordered  moderate sedation.   If patient BMI > 50 do not schedule in [facility].    [facility] 45 [facility]    Are you taking methadone or Suboxone?  No    Have you had difficulties, pain, or discomfort during past endoscopy procedures?  No    Are you taking any prescription medications for pain 3 or more times per week?   NO - No RN review required.    Do you have a history of malignant hyperthermia or adverse reaction to anesthesia?  No    (Females) Are you currently pregnant?   No     Have you been diagnosed or told you have pulmonary hypertension?   No    Do you have an LVAD?  No    Have you been told you have moderate to severe sleep apnea?  Yes (RN Review required for scheduling unless scheduling in Hospital.)    Have you been told you have COPD, asthma, or any other lung disease?  No    Do you have any heart conditions?  No     Have you ever had an organ transplant?   No    Have you ever had or are you awaiting a heart or lung transplant?   No    Have you had a stroke or transient ischemic attack (TIA aka \"mini stroke\" in the last 6 months?   No    Have you been diagnosed with or been told you have cirrhosis of the liver?   No    Are you currently on dialysis?   No    Do you need assistance transferring?   No    BMI: Estimated body mass index is 45.54 kg/m  as calculated from the following:    Height as of 2/26/24: 1.727 m (5' 8\").    Weight as of 2/26/24: 135.9 kg (299 lb 8 oz). "     Is patients BMI > 40 and scheduling location [facility]?  No    Do you take an injectable medication for weight loss or diabetes (excluding insulin)?  No    Do you take the medication Naltrexone?  No    Do you take blood thinners?  No       Prep   Are you currently on dialysis or do you have chronic kidney disease?  No    Do you have a diagnosis of diabetes?  No    Do you have a diagnosis of cystic fibrosis (CF)?  No    On a regular basis do you go 3 -5 days between bowel movements?  No    BMI > 40?  Yes (Extended Prep)    Preferred Pharmacy:    [id number] - [place], [place] - [address] [address] AT [facility] [address]  [address] [address] [facility] [address] [facility] [address]  Phone: [phone] Fax: [phone]        Final Scheduling Details   Colonoscopy prep sent?  N/A    Procedure scheduled  Colonoscopy    Surgeon:  Nany     Date of procedure:  4/4     Pre-OP / PAC:   No - Not required for this site.    Location  [facility] - Patient preference.    Sedation   MAC/Deep Sedation  site      Patient Reminders:   You will receive a call from a Nurse to review instructions and health history.  This assessment must be completed prior to your procedure.  Failure to complete the Nurse assessment may result in the procedure being cancelled.      On the day of your procedure, please designate an adult(s) who can drive you home stay with you for the next 24 hours. The medicines used in the exam will make you sleepy. You will not be able to drive.      You cannot take public transportation, ride share services, or non-medical taxi service without a responsible caregiver.  Medical transport services are allowed with the requirement that a responsible caregiver will receive you at your destination.  We require that drivers and caregivers are confirmed prior to your procedure.

## 2024-04-04 ENCOUNTER — HOSPITAL ENCOUNTER (OUTPATIENT)
Facility: CLINIC | Age: 49
Discharge: HOME | End: 2024-04-04
Attending: SURGERY | Admitting: SURGERY
Payer: COMMERCIAL

## 2024-04-04 ENCOUNTER — ANESTHESIA EVENT (OUTPATIENT)
Dept: GASTROENTEROLOGY | Facility: CLINIC | Age: 49
End: 2024-04-04
Payer: COMMERCIAL

## 2024-04-04 ENCOUNTER — ANESTHESIA (OUTPATIENT)
Dept: GASTROENTEROLOGY | Facility: CLINIC | Age: 49
End: 2024-04-04
Payer: COMMERCIAL

## 2024-04-04 VITALS
SYSTOLIC BLOOD PRESSURE: 113 MMHG | HEART RATE: 85 BPM | TEMPERATURE: 98.5 F | DIASTOLIC BLOOD PRESSURE: 64 MMHG | RESPIRATION RATE: 18 BRPM | OXYGEN SATURATION: 93 %

## 2024-04-04 LAB — COLONOSCOPY: NORMAL

## 2024-04-04 PROCEDURE — 250N000011 HC RX IP 250 OP 636: Performed by: NURSE ANESTHETIST, CERTIFIED REGISTERED

## 2024-04-04 PROCEDURE — 258N000003 HC RX IP 258 OP 636: Performed by: NURSE ANESTHETIST, CERTIFIED REGISTERED

## 2024-04-04 PROCEDURE — 250N000009 HC RX 250: Performed by: NURSE ANESTHETIST, CERTIFIED REGISTERED

## 2024-04-04 PROCEDURE — 45378 DIAGNOSTIC COLONOSCOPY: CPT | Performed by: SURGERY

## 2024-04-04 PROCEDURE — G0121 COLON CA SCRN NOT HI RSK IND: HCPCS | Performed by: SURGERY

## 2024-04-04 PROCEDURE — 370N000017 HC ANESTHESIA TECHNICAL FEE, PER MIN: Performed by: SURGERY

## 2024-04-04 RX ORDER — SODIUM CHLORIDE, SODIUM LACTATE, POTASSIUM CHLORIDE, CALCIUM CHLORIDE 600; 310; 30; 20 MG/100ML; MG/100ML; MG/100ML; MG/100ML
INJECTION, SOLUTION INTRAVENOUS CONTINUOUS
Status: DISCONTINUED | OUTPATIENT
Start: 2024-04-04 | End: 2024-04-04 | Stop reason: HOSPADM

## 2024-04-04 RX ORDER — NALOXONE HYDROCHLORIDE 0.4 MG/ML
0.4 INJECTION, SOLUTION INTRAMUSCULAR; INTRAVENOUS; SUBCUTANEOUS
Status: DISCONTINUED | OUTPATIENT
Start: 2024-04-04 | End: 2024-04-04 | Stop reason: HOSPADM

## 2024-04-04 RX ORDER — FENTANYL CITRATE 50 UG/ML
25 INJECTION, SOLUTION INTRAMUSCULAR; INTRAVENOUS
Status: DISCONTINUED | OUTPATIENT
Start: 2024-04-04 | End: 2024-04-04 | Stop reason: HOSPADM

## 2024-04-04 RX ORDER — ONDANSETRON 2 MG/ML
4 INJECTION INTRAMUSCULAR; INTRAVENOUS EVERY 6 HOURS PRN
Status: DISCONTINUED | OUTPATIENT
Start: 2024-04-04 | End: 2024-04-04 | Stop reason: HOSPADM

## 2024-04-04 RX ORDER — ONDANSETRON 2 MG/ML
4 INJECTION INTRAMUSCULAR; INTRAVENOUS EVERY 30 MIN PRN
Status: DISCONTINUED | OUTPATIENT
Start: 2024-04-04 | End: 2024-04-04 | Stop reason: HOSPADM

## 2024-04-04 RX ORDER — ONDANSETRON 2 MG/ML
4 INJECTION INTRAMUSCULAR; INTRAVENOUS
Status: DISCONTINUED | OUTPATIENT
Start: 2024-04-04 | End: 2024-04-04 | Stop reason: HOSPADM

## 2024-04-04 RX ORDER — FLUMAZENIL 0.1 MG/ML
0.2 INJECTION, SOLUTION INTRAVENOUS
Status: DISCONTINUED | OUTPATIENT
Start: 2024-04-04 | End: 2024-04-04 | Stop reason: HOSPADM

## 2024-04-04 RX ORDER — NALOXONE HYDROCHLORIDE 0.4 MG/ML
0.2 INJECTION, SOLUTION INTRAMUSCULAR; INTRAVENOUS; SUBCUTANEOUS
Status: DISCONTINUED | OUTPATIENT
Start: 2024-04-04 | End: 2024-04-04 | Stop reason: HOSPADM

## 2024-04-04 RX ORDER — LIDOCAINE HYDROCHLORIDE 20 MG/ML
INJECTION, SOLUTION INFILTRATION; PERINEURAL PRN
Status: DISCONTINUED | OUTPATIENT
Start: 2024-04-04 | End: 2024-04-04

## 2024-04-04 RX ORDER — PROPOFOL 10 MG/ML
INJECTION, EMULSION INTRAVENOUS CONTINUOUS PRN
Status: DISCONTINUED | OUTPATIENT
Start: 2024-04-04 | End: 2024-04-04

## 2024-04-04 RX ORDER — LIDOCAINE 40 MG/G
CREAM TOPICAL
Status: DISCONTINUED | OUTPATIENT
Start: 2024-04-04 | End: 2024-04-04 | Stop reason: HOSPADM

## 2024-04-04 RX ORDER — PROCHLORPERAZINE MALEATE 5 MG
10 TABLET ORAL EVERY 6 HOURS PRN
Status: DISCONTINUED | OUTPATIENT
Start: 2024-04-04 | End: 2024-04-04 | Stop reason: HOSPADM

## 2024-04-04 RX ORDER — PROPOFOL 10 MG/ML
INJECTION, EMULSION INTRAVENOUS PRN
Status: DISCONTINUED | OUTPATIENT
Start: 2024-04-04 | End: 2024-04-04

## 2024-04-04 RX ORDER — ONDANSETRON 4 MG/1
4 TABLET, ORALLY DISINTEGRATING ORAL EVERY 30 MIN PRN
Status: DISCONTINUED | OUTPATIENT
Start: 2024-04-04 | End: 2024-04-04 | Stop reason: HOSPADM

## 2024-04-04 RX ORDER — ONDANSETRON 4 MG/1
4 TABLET, ORALLY DISINTEGRATING ORAL EVERY 6 HOURS PRN
Status: DISCONTINUED | OUTPATIENT
Start: 2024-04-04 | End: 2024-04-04 | Stop reason: HOSPADM

## 2024-04-04 RX ADMIN — LIDOCAINE HYDROCHLORIDE 50 MG: 20 INJECTION, SOLUTION INFILTRATION; PERINEURAL at 10:15

## 2024-04-04 RX ADMIN — SODIUM CHLORIDE, POTASSIUM CHLORIDE, SODIUM LACTATE AND CALCIUM CHLORIDE: 600; 310; 30; 20 INJECTION, SOLUTION INTRAVENOUS at 10:05

## 2024-04-04 RX ADMIN — PROPOFOL 200 MCG/KG/MIN: 10 INJECTION, EMULSION INTRAVENOUS at 10:17

## 2024-04-04 RX ADMIN — PROPOFOL 100 MG: 10 INJECTION, EMULSION INTRAVENOUS at 10:17

## 2024-04-04 ASSESSMENT — ACTIVITIES OF DAILY LIVING (ADL)
ADLS_ACUITY_SCORE: 35
ADLS_ACUITY_SCORE: 35

## 2024-04-04 ASSESSMENT — LIFESTYLE VARIABLES: TOBACCO_USE: 0

## 2024-04-04 NOTE — ANESTHESIA CARE TRANSFER NOTE
Patient: Alexey Underwood    Procedure: Procedure(s):  Colonoscopy       Diagnosis: Screen for colon cancer [Z12.11]  Diagnosis Additional Information: No value filed.    Anesthesia Type:   MAC     Note:    Oropharynx: spontaneously breathing  Level of Consciousness: awake  Oxygen Supplementation: room air    Independent Airway: airway patency satisfactory and stable  Dentition: dentition unchanged  Vital Signs Stable: post-procedure vital signs reviewed and stable  Report to RN Given: handoff report given  Patient transferred to: Phase II    Handoff Report: Identifed the Patient, Identified the Reponsible Provider, Reviewed the pertinent medical history, Discussed the surgical course, Reviewed Intra-OP anesthesia mangement and issues during anesthesia, Set expectations for post-procedure period and Allowed opportunity for questions and acknowledgement of understanding      Vitals:  Vitals Value Taken Time   /71 04/04/24 1034   Temp     Pulse 87 04/04/24 1034   Resp     SpO2 92 % 04/04/24 1040   Vitals shown include unfiled device data.    Electronically Signed By: MARYLIN Clinton CRNA  April 4, 2024  10:41 AM

## 2024-04-04 NOTE — ANESTHESIA PREPROCEDURE EVALUATION
Anesthesia Pre-Procedure Evaluation    Patient: Alexey Underwood   MRN: [id number] : 1975        Procedure : Procedure(s):  Colonoscopy          Past Medical History:   Diagnosis Date    Diverticulosis       Past Surgical History:   Procedure Laterality Date    NO HISTORY OF SURGERY        Allergies   Allergen Reactions    No Known Allergies       Social History     Tobacco Use    Smoking status: Never     Passive exposure: Never    Smokeless tobacco: Never   Substance Use Topics    Alcohol use: Yes     Comment: 1-2 times/month      Wt Readings from Last 1 Encounters:   24 135.9 kg (299 lb 8 oz)        Anesthesia Evaluation   Pt has not had prior anesthetic         ROS/MED HX  ENT/Pulmonary:    (-) tobacco use   Neurologic:  - neg neurologic ROS     Cardiovascular:     (+)  hypertension- -   -  - -                                 Previous cardiac testing   Echo: Date: Results:    Stress Test:  Date: Results:    ECG Reviewed:  Date: 23 Results:  SR  Cath:  Date: Results:      METS/Exercise Tolerance:     Hematologic:  - neg hematologic  ROS     Musculoskeletal:  - neg musculoskeletal ROS     GI/Hepatic: Comment: Diverticulosis     (+)        bowel prep,         (-) GERD   Renal/Genitourinary:  - neg Renal ROS     Endo:     (+)               Obesity,       Psychiatric/Substance Use:  - neg psychiatric ROS     Infectious Disease:  - neg infectious disease ROS     Malignancy:  - neg malignancy ROS     Other:  - neg other ROS          Physical Exam    Airway        Mallampati: II   TM distance: > 3 FB   Neck ROM: full   Mouth opening: > 3 cm    Respiratory Devices and Support         Dental           Cardiovascular   cardiovascular exam normal       Rhythm and rate: regular and normal     Pulmonary   pulmonary exam normal        breath sounds clear to auscultation           OUTSIDE LABS:  CBC:   Lab Results   Component Value Date    WBC 7.2 2024    WBC 6.2 2023    HGB 15.0 2024    HGB  "15.3 01/02/2023    HCT 45.9 02/26/2024    HCT 47.1 01/02/2023     02/26/2024     01/02/2023     BMP:   Lab Results   Component Value Date     02/26/2024     01/02/2023    POTASSIUM 3.6 02/26/2024    POTASSIUM 3.7 04/17/2023    CHLORIDE 101 02/26/2024    CHLORIDE 108 01/02/2023    CO2 26 02/26/2024    CO2 33 (H) 01/02/2023    BUN 18.7 02/26/2024    BUN 20 01/02/2023    CR 0.71 02/26/2024    CR 1.06 01/02/2023    GLC 92 02/26/2024     (H) 01/02/2023     COAGS: No results found for: \"PTT\", \"INR\", \"FIBR\"  POC: No results found for: \"BGM\", \"HCG\", \"HCGS\"  HEPATIC:   Lab Results   Component Value Date    ALBUMIN 4.2 02/26/2024    PROTTOTAL 7.5 02/26/2024    ALT 57 02/26/2024    AST 31 02/26/2024    ALKPHOS 89 02/26/2024    BILITOTAL 0.3 02/26/2024     OTHER:   Lab Results   Component Value Date    PH 5.0 02/11/2003    A1C 6.1 (H) 02/26/2024    JOHNNY 9.3 02/26/2024    LIPASE 35 (L) 11/17/2019    TSH 1.22 10/21/2019    T4 1.13 03/08/2005       Anesthesia Plan    ASA Status:  2    NPO Status:  NPO Appropriate    Anesthesia Type: MAC.     - Reason for MAC: straight local not clinically adequate   Induction: Intravenous, Propofol.   Maintenance: TIVA.        Consents    Anesthesia Plan(s) and associated risks, benefits, and realistic alternatives discussed. Questions answered and patient/representative(s) expressed understanding.     - Discussed:     - Discussed with:  Patient      - Extended Intubation/Ventilatory Support Discussed: No.      - Patient is DNR/DNI Status: No     Use of blood products discussed: No .     Postoperative Care       PONV prophylaxis: Background Propofol Infusion     Comments:    Other Comments: The risks and benefits of anesthesia, and the alternatives where applicable, have been discussed with the patient, and they wish to proceed.              MARYLIN Gomez CRNA    I have reviewed the pertinent notes and labs in the chart from the past 30 days and " (re)examined the patient.  Any updates or changes from those notes are reflected in this note.

## 2024-04-04 NOTE — DISCHARGE INSTRUCTIONS
[facility]    Home Care Following Endoscopy          Activity:  You have just undergone an endoscopic procedure usually performed with conscious sedation.  Do not work or operate machinery (including a car) for at least 12 hours.    I encourage you to walk and attempt to pass this air as soon as possible.    Diet:  Return to the diet you were on before your procedure but eat lightly for the first 12-24 hours.  Drink plenty of water.  Resume any regular medications unless otherwise advised by your physician.  Please begin any new medication prescribed as a result of your procedure as directed by your physician.   If you had any biopsy or polyp removed please refrain from aspirin or aspirin products for 2 days.  If on Coumadin please restart as instructed by your physician.   Pain:  You may take Tylenol as needed for pain.  Expected Recovery:  You can expect some mild abdominal fullness and/or discomfort due to the air used to inflate your intestinal tract. It is also normal to have a mild sore throat after upper endoscopy.    Call Your Physician if You Have:  After Colonoscopy:  Worsening persisting abdominal pain which is worse with activity.  Fevers (>101 degrees F), chills or shakes.  Passage of continued blood with bowel movements.     Any questions or concerns about your recovery, please call [phone] or after hours [phone] ([phone]) Nurse Advice Line.    Follow-up Care:  You did not have polyps/biopsy tissue sample(s) removed.

## 2024-04-04 NOTE — LETTER
March 21, 2024      Alexey Underwood  [address] [facility] [address]              Dear Cammy Blackburn Extended Colonoscopy Prep  Prep instructions for colonoscopy   Pre-Assessment Phone Number: [facility]; [phone]      Please read these instructions carefully at least 7 days prior to your colonoscopy procedure. Be sure to follow all directions completely. The inside of your colon must be clean to allow for a complete examination for the presence of any growths, polyps, and/or abnormalities, as well as their biopsy or removal. A number of tips are included in order to make this part of the procedure as comfortable as possible.    Immediately:  A nurse will call you to go over instructions and your health history.  It's important to complete the nurse assessment before your procedure. If you don't, your procedure might have to be canceled.  You must arrange for an adult to drive you home after your exam. Your colonoscopy cannot be done unless you have a ride. If you need to use public transportation, someone must ride with you and stay with you for a minimum of 6-24 hours.  Check with your insurance company to be sure they will cover this exam.Arrange for a responsible adult to drive you home on the day of the exam. This cannot be a taxi or a bus as you will need someone with you after the procedure.    7 days prior:  Talk to your prescribing provider: If you take blood thinners (such as Coumadin, Plavix, Xarelto, Eliquis, Lovenox, or others), these medications may need to be stopped temporarily before your procedure. Your prescribing provider will tell you what to do.   Talk to your prescribing provider: If you take prescription NSAIDS (such as Sulindac, Celebrex, Mobic, Relafen). Your prescribing provider will tell you what to do.   If you have diabetes, you should request an early morning appointment.  Stop taking fiber supplements and multivitamins containing iron, or any other medications  containing iron.  Fill your prescription for 2 containers of Golytely and 4 Dulcolax tablets at the pharmacy.  It is very important that you stay well hydrated during the colonoscopy prep. The Golytely bowel prep is designed to clean out your colon, but it will not provide hydration. While you are taking the prescribed prep, you should also drink 64 oz. of Gatorade or similar sports drink product to drink for staying hydrated. (Avoid red and purple colors)  Stop eating corn, popcorn, nuts and foods with seeds.   Begin a restricted or low fiber diet (see list below).    2 days prior:  Drink fluids to be well hydrated, this is important. Drink at least 4 large glasses of water, Gatorade, or other similar sports drinks.  It is a good idea to use Vaseline on the skin around your anus after each bowel movement to prevent irritation. Wet wipes also help to reduce irritation.   You can have a light, low-fiber breakfast. You may also have a light, low-fiber lunch.  No solid foods after 1pm. Begin a clear liquid diet. (see list below).  At 4pm, take 2 Dulcolax (bisacodyl) tablets.  At 5pm, mix and drink half of a jug of Golytely bowel prep. Drink an 8 oz. Glass of Golytely every 10-15 minutes until half of the jug is gone. Place the remainder of the Golytely in the refrigerator. Stay close to the bathroom.     1 day prior:  Continue clear liquid diet only (see examples below). Do not eat solid food this day.  Do not drink any red or purple colored drinks.  Stop taking NSAID pain relievers, such as Advil, Ibuprofen, Motrin, etc.  You may take Tylenol.  At 4 pm, take 2 Dulcolax (bisacodyl) tablets.  At 5 pm, drink the 2nd half of a jug of Golytely bowel prep. Drink an 8 oz. glass of Golytely every 10-15 minutes until the 1st jug of Golytely is gone.   The Golytely bowel prep will not keep you hydrated. You should drink 8-10 glasses of clear liquids throughout the day.  Before you go to bed, mix the 2nd container of Golytely  and place in the refrigerator for the morning.      Procedure day:  6 hours before your check-in time, drink an 8 oz. Glass of Golytely every 10-15 minutes until half of the 2nd jug of Golytely is gone. You WILL NOT drink the entire 2nd jug of Golytely.   You may take your necessary morning medications with sips of water  Do not take diabetes medicine by mouth until after your exam.  You may drink clear liquids only up until 2 hours before your arrival time.  Do not smoke, chew tobacco, or swallow anything, including water or gum for at least 2 hours before your arrival time. This is a safety issue. Your procedure could be cancelled if you do not follow directions.  Please do not wear jewelry (i.e. earrings, rings, necklaces, watches, etc) . Leave your purse, billfold, credit cards, and other valuables at home.   Please arrive with a responsible adult who can take you home after the test and stay with you for a minimum of 24 hours: The medicine used will make you sleepy and forgetful. If you do not have someone to take you home, we will cancel your procedure. If using public transportation you must have someone to ride with you.  Please perform your nebulizer treatments and airway clearance therapy in the morning prior to the procedure (if applicable).  If you have asthma, bring your inhalers.    CLEAR LIQUID DIET   You may have:  Water, tea, coffee (no milk or cream)  Soda pop, Gatorade (not red or purple)  Jell-O, Popsicles (no milk or fruit pieces - not red or purple)  Fat-free soup broth or bouillon  Plain hard candy, such as clear life savers (not red or purple)  Clear juices and fruit-flavored drinks, such as apple juice, white grape juice, Hi-C, and Ronnie-Aid (not red or purple)   Do not have:  Milk or milk products such as ice cream, malts or shakes, or coffee creamer  Red or purple drinks of any kind such as cranberry juice or grape juice. Avoid red or purple Jell-O, Popsicles, Ronnie-Aid, sorbet, sherbet and  candy  Juices with pulp such as orange, grapefruit, pineapple or tomato juice  Cream soups of any kind  Alcohol and beer  Protein drinks or protein powder     LOW FIBER DIET   You may have:    Starches: White bread, rolls, biscuits, croissants, [place] toast, white flour tortillas, waffles, pancakes, Czech toast; white rice, noodles, pasta, macaroni; cooked and peeled potatoes; plain crackers, saltines; cooked farina or cream of rice; puffed rice, corn flakes, Rice Krispies, Special K   Vegetables: tender cooked and canned, vegetable broths  Fruits and fruit juices: Strained fruit juice, canned fruit without seeds or skin (not pineapple), applesauce, pear sauce, ripe bananas, melons (not watermelon)   Milk products: Milk (plain or flavored), cheese, cottage cheese, yogurt (no berries), custard, ice cream    Proteins: Tender, well-cooked ground beef, lamb, veal, ham, pork, chicken, turkey, fish or organ meats; eggs; creamy peanut butter   Fats and condiments:  Margarine, butter, oils, mayonnaise, sour cream, salad dressing, plain gravy; spices, cooked herbs; sugar, clear jelly, honey, syrup   Snacks, sweets and drinks: Pretzels, hard candy; plain cakes and cookies (no nuts or seeds); gelatin, plain pudding, sherbet, Popsicles; coffee, tea, carbonated ( fizzy ) drinks Do not have:    Starches: Breads or rolls that contain nuts, seeds or fruit; whole wheat or whole grain breads that contain more than 1 gram of fiber per slice; cornbread; corn or whole wheat tortillas; potatoes with skin; brown rice, wild rice, kasha (buckwheat), and oatmeal   Vegetables: Any raw or steamed vegetables; vegetables with seeds; corn in any form   Fruits and fruit juices: Prunes, prune juice, raisins and other dried fruits, berries and other fruits with seeds, canned pineapple juices with pulp such as orange, grapefruit, pineapple or tomato juice  Milk products: Any yogurt with nuts, seeds or berries   Proteins: Tough, fibrous meats with  gristle; cooked dried beans, peas or lentils; crunchy peanut butter  Fats and condiments: Pickles, olives, relish, horseradish; jam, marmalade, preserves   Snacks, sweets and drinks: Popcorn, nuts, seeds, granola, coconut, candies made with nuts or seeds; all desserts that contain nuts, seeds, raisins and other dried fruits, coconut, whole grains or bran.      FAQ:    How do you know if your colon is cleaned out?   After completing the bowel prep, your bowel movements should be all liquid and yellow. Your bowel movements will look similar to urine in the toilet. If there are pieces of stool (poop) in the toilet, or if you can't see to the bottom of the toilet, please call our office for advice. Call [phone] and ask to speak with a nurse.   Why do you need a responsible  to take you home and stay with you?  We require a responsible adult to take you home for your safety. The sedation medicines used to relax you during the procedure can impair your judgement and reaction time, make you forgetful and possible a little unsteady. Do not drive, make any important decisions, or sign any legal documents for 24 hours after your procedure.   It is normal to feel bloated and gassy after your procedure. Walking will help move the air through your colon. You can take non-aspirin pain relievers that contain acetaminophen (Tylenol).   When can you eat after your procedure?  You may resume your normal diet when you feel ready, unless advised otherwise by the doctor performing your procedure. Do not drink alcohol for 24 hours after your procedure.   You many resume normal activities (work, exercise, etc.) after 24 hours.   When will you get test results?  You should have your procedure results and any lab results (if applicable) by letter, MyChart message, or phone call within 2 weeks. If you have any questions, please call the doctor that referred you for the procedure.       Thank you for choosing [facility] for  your procedure. If you are sent a survey regarding your care, please take the time to complete the questionnaire. We value your feedback!        Sincerely,       RN, CNP

## 2024-04-04 NOTE — ANESTHESIA POSTPROCEDURE EVALUATION
Patient: Alexey Underwood    Procedure: Procedure(s):  Colonoscopy       Anesthesia Type:  MAC    Note:  Disposition: Outpatient   Postop Pain Control: Uneventful            Sign Out: Well controlled pain   PONV: No   Neuro/Psych: Uneventful            Sign Out: Acceptable/Baseline neuro status   Airway/Respiratory: Uneventful            Sign Out: Acceptable/Baseline resp. status   CV/Hemodynamics: Uneventful            Sign Out: Acceptable CV status   Other NRE: NONE   DID A NON-ROUTINE EVENT OCCUR? No    Event details/Postop Comments:  Pt was happy with anesthesia care.  No complications.  I will follow up with the pt if needed.           Last vitals:  Vitals Value Taken Time   /71 04/04/24 1034   Temp     Pulse 87 04/04/24 1034   Resp     SpO2 92 % 04/04/24 1040   Vitals shown include unfiled device data.    Electronically Signed By: MARYLIN Clinton CRNA  April 4, 2024  10:41 AM

## 2024-04-04 NOTE — H&P
Patient seen for Endoscopy    HPI:  Patient is a 48 year old adult here for endoscopy. Not taking blood thinning medications. No MI or CVA history. No issues with previous sedation. No recent acute illness.    Review Of Systems    Skin: negative  Ears/Nose/Throat: negative  Respiratory: No shortness of breath, dyspnea on exertion, cough, or hemoptysis  Cardiovascular: negative  Gastrointestinal: negative  Genitourinary: negative  Musculoskeletal: negative  Neurologic: negative  Hematologic/Lymphatic/Immunologic: negative  Endocrine: negative      Past Medical History:   Diagnosis Date    Diverticulosis        Past Surgical History:   Procedure Laterality Date    NO HISTORY OF SURGERY         Family History   Problem Relation Age of Onset    Hypertension Father     Diabetes Maternal Grandmother     Diabetes Maternal Grandfather     Asthma No family hx of     C.A.D. No family hx of     Cerebrovascular Disease No family hx of     Breast Cancer No family hx of     Cancer - colorectal No family hx of     Prostate Cancer No family hx of     Alcohol/Drug No family hx of     Allergies No family hx of     Alzheimer Disease No family hx of     Anesthesia Reaction No family hx of     Arthritis No family hx of     Blood Disease No family hx of     Cancer No family hx of     Cardiovascular No family hx of     Circulatory No family hx of     Connective Tissue Disorder No family hx of     Congenital Anomalies No family hx of     Depression No family hx of     Eye Disorder No family hx of     Genetic Disorder No family hx of     Gastrointestinal Disease No family hx of     Genitourinary Problems No family hx of     Gynecology No family hx of     Heart Disease No family hx of     Lipids No family hx of     Musculoskeletal Disorder No family hx of     Neurologic Disorder No family hx of     Obesity No family hx of     Osteoporosis No family hx of     Psychotic Disorder No family hx of     Respiratory No family hx of     Thyroid  Disease No family hx of     Hearing Loss No family hx of        Social History     Socioeconomic History    Marital status:      Spouse name: Ryanne    Number of children: 1    Years of education: Not on file    Highest education level: Not on file   Occupational History    Occupation:      Employer: SELF EMPLOYED   Tobacco Use    Smoking status: Never     Passive exposure: Never    Smokeless tobacco: Never   Vaping Use    Vaping Use: Never used   Substance and Sexual Activity    Alcohol use: Yes     Comment: 1-2 times/month    Drug use: No    Sexual activity: Never   Other Topics Concern    Parent/sibling w/ CABG, MI or angioplasty before 65F 55M? Not Asked   Social History Narrative    Not on file     Social Determinants of Health     Financial Resource Strain: Low Risk  (2/26/2024)    Financial Resource Strain     Within the past 12 months, have you or your family members you live with been unable to get utilities (heat, electricity) when it was really needed?: No   Food Insecurity: Low Risk  (2/26/2024)    Food Insecurity     Within the past 12 months, did you worry that your food would run out before you got money to buy more?: No     Within the past 12 months, did the food you bought just not last and you didn t have money to get more?: No   Transportation Needs: Low Risk  (2/26/2024)    Transportation Needs     Within the past 12 months, has lack of transportation kept you from medical appointments, getting your medicines, non-medical meetings or appointments, work, or from getting things that you need?: No   Physical Activity: Insufficiently Active (2/26/2024)    Exercise Vital Sign     Days of Exercise per Week: 5 days     Minutes of Exercise per Session: 20 min   Stress: No Stress Concern Present (2/26/2024)    [employer] - Occupational Stress Questionnaire     Feeling of Stress : Not at all   Social Connections: Unknown (2/26/2024)    Social Connection and  Isolation Panel [NHANES]     Frequency of Communication with Friends and Family: Not on file     Frequency of Social Gatherings with Friends and Family: More than three times a week     Attends [employer] Services: Not on file     Active Member of Clubs or Organizations: Not on file     Attends Club or Organization Meetings: Not on file     Marital Status: Not on file   Interpersonal Safety: Low Risk  (2/26/2024)    Interpersonal Safety     Do you feel physically and emotionally safe where you currently live?: Yes     Within the past 12 months, have you been hit, slapped, kicked or otherwise physically hurt by someone?: No     Within the past 12 months, have you been humiliated or emotionally abused in other ways by your partner or ex-partner?: No   Housing Stability: Low Risk  (2/26/2024)    Housing Stability     Do you have housing? : Yes     Are you worried about losing your housing?: No       No current outpatient medications on file.       Medications and history reviewed    Physical exam:  Vitals: BP (!) 144/87 (BP Location: Right arm, Patient Position: Supine, Cuff Size: Adult Regular)   Pulse 82   Temp 98.5  F (36.9  C) (Temporal)   Resp 18   LMP  (LMP Unknown)   SpO2 95%   BMI= There is no height or weight on file to calculate BMI.    Constitutional: Healthy, alert, non-distressed   Head: Normo-cephalic, atraumatic, no lesions, masses or tenderness   Cardiovascular: RRR, no new murmurs, +S1, +S2 heart sounds, no clicks, rubs or gallops   Respiratory: CTAB, no rales, rhonchi or wheezing, equal chest rise, good respiratory effort   Gastrointestinal: Soft, non-tender, non distended, no rebound rigidity or guarding, no masses or hernias palpated   : Deferred  Musculoskeletal: Moves all extremities, normal  strength, no deformities noted   Skin: No suspicious lesions or rashes   Psychiatric: Mentation appears normal, affect appropriate   Hematologic/Lymphatic/Immunologic: Normal cervical and  supraclavicular lymph nodes   Patient able to get up on table without difficulty.    Labs show:  No results found for this or any previous visit (from the past 24 hour(s)).    Assessment: Endoscopy  Plan: Pt cleared for anesthesia for proposed procedure.    Iván Ayon, DO

## 2024-09-12 DIAGNOSIS — I10 ESSENTIAL HYPERTENSION: ICD-10-CM

## 2024-09-12 RX ORDER — HYDROCHLOROTHIAZIDE 25 MG/1
25 TABLET ORAL DAILY
Qty: 90 TABLET | Refills: 1 | Status: SHIPPED | OUTPATIENT
Start: 2024-09-12

## 2024-09-12 RX ORDER — LISINOPRIL 40 MG/1
40 TABLET ORAL DAILY
Qty: 90 TABLET | Refills: 1 | Status: SHIPPED | OUTPATIENT
Start: 2024-09-12

## 2025-01-27 ENCOUNTER — PATIENT OUTREACH (OUTPATIENT)
Dept: CARE COORDINATION | Facility: CLINIC | Age: 50
End: 2025-01-27
Payer: COMMERCIAL

## 2025-02-10 ENCOUNTER — PATIENT OUTREACH (OUTPATIENT)
Dept: CARE COORDINATION | Facility: CLINIC | Age: 50
End: 2025-02-10
Payer: COMMERCIAL

## 2025-03-10 DIAGNOSIS — I10 ESSENTIAL HYPERTENSION: ICD-10-CM

## 2025-03-10 RX ORDER — LISINOPRIL 40 MG/1
40 TABLET ORAL DAILY
Qty: 30 TABLET | Refills: 0 | Status: SHIPPED | OUTPATIENT
Start: 2025-03-10

## 2025-03-10 RX ORDER — HYDROCHLOROTHIAZIDE 25 MG/1
25 TABLET ORAL DAILY
Qty: 30 TABLET | Refills: 0 | Status: SHIPPED | OUTPATIENT
Start: 2025-03-10

## 2025-03-10 NOTE — TELEPHONE ENCOUNTER
Scheduled.    Future Appointments 3/10/2025 - 9/6/2025        Date Visit Type Length Department Provider     4/7/2025  8:30 AM OFFICE VISIT 30 min [facility] Es Mccoy PA-C    Location Instructions:     [facility] is located at [address] [address], one mile north of the [address] [facility] exit off of [place] [address]. From [address] [facility], exit to turn west on [address] [place] [address], then turn south on [address].                    Yun Casey MA

## 2025-04-07 ENCOUNTER — OFFICE VISIT (OUTPATIENT)
Dept: FAMILY MEDICINE | Facility: CLINIC | Age: 50
End: 2025-04-07
Payer: COMMERCIAL

## 2025-04-07 VITALS
BODY MASS INDEX: 44.24 KG/M2 | RESPIRATION RATE: 19 BRPM | OXYGEN SATURATION: 96 % | SYSTOLIC BLOOD PRESSURE: 142 MMHG | WEIGHT: 291.9 LBS | TEMPERATURE: 98.3 F | HEART RATE: 76 BPM | DIASTOLIC BLOOD PRESSURE: 94 MMHG | HEIGHT: 68 IN

## 2025-04-07 DIAGNOSIS — Z13.1 SCREENING FOR DIABETES MELLITUS: ICD-10-CM

## 2025-04-07 DIAGNOSIS — G47.33 OSA (OBSTRUCTIVE SLEEP APNEA): ICD-10-CM

## 2025-04-07 DIAGNOSIS — Z00.00 ROUTINE GENERAL MEDICAL EXAMINATION AT A HEALTH CARE FACILITY: Primary | ICD-10-CM

## 2025-04-07 DIAGNOSIS — Z13.6 CARDIOVASCULAR SCREENING; LDL GOAL LESS THAN 160: ICD-10-CM

## 2025-04-07 DIAGNOSIS — I10 ESSENTIAL HYPERTENSION: ICD-10-CM

## 2025-04-07 LAB
ALBUMIN SERPL BCG-MCNC: 4.4 G/DL (ref 3.5–5.2)
ALP SERPL-CCNC: 103 U/L (ref 40–150)
ALT SERPL W P-5'-P-CCNC: 58 U/L (ref 0–70)
ANION GAP SERPL CALCULATED.3IONS-SCNC: 15 MMOL/L (ref 7–15)
AST SERPL W P-5'-P-CCNC: 38 U/L (ref 0–45)
BILIRUB SERPL-MCNC: 0.5 MG/DL
BUN SERPL-MCNC: 17.3 MG/DL (ref 6–20)
CALCIUM SERPL-MCNC: 9.7 MG/DL (ref 8.8–10.4)
CHLORIDE SERPL-SCNC: 100 MMOL/L (ref 98–107)
CHOLEST SERPL-MCNC: 156 MG/DL
CREAT SERPL-MCNC: 0.85 MG/DL (ref 0.51–1.17)
EGFRCR SERPLBLD CKD-EPI 2021: >90 ML/MIN/1.73M2
EST. AVERAGE GLUCOSE BLD GHB EST-MCNC: 128 MG/DL
FASTING STATUS PATIENT QL REPORTED: YES
FASTING STATUS PATIENT QL REPORTED: YES
GLUCOSE SERPL-MCNC: 117 MG/DL (ref 70–99)
HBA1C MFR BLD: 6.1 % (ref 0–5.6)
HCO3 SERPL-SCNC: 24 MMOL/L (ref 22–29)
HDLC SERPL-MCNC: 45 MG/DL
LDLC SERPL CALC-MCNC: 92 MG/DL
NONHDLC SERPL-MCNC: 111 MG/DL
POTASSIUM SERPL-SCNC: 4.4 MMOL/L (ref 3.4–5.3)
PROT SERPL-MCNC: 8 G/DL (ref 6.4–8.3)
SODIUM SERPL-SCNC: 139 MMOL/L (ref 135–145)
TRIGL SERPL-MCNC: 97 MG/DL

## 2025-04-07 PROCEDURE — 3080F DIAST BP >= 90 MM HG: CPT | Performed by: PHYSICIAN ASSISTANT

## 2025-04-07 PROCEDURE — 36415 COLL VENOUS BLD VENIPUNCTURE: CPT | Performed by: PHYSICIAN ASSISTANT

## 2025-04-07 PROCEDURE — 99214 OFFICE O/P EST MOD 30 MIN: CPT | Mod: 25 | Performed by: PHYSICIAN ASSISTANT

## 2025-04-07 PROCEDURE — 3077F SYST BP >= 140 MM HG: CPT | Performed by: PHYSICIAN ASSISTANT

## 2025-04-07 PROCEDURE — 80053 COMPREHEN METABOLIC PANEL: CPT | Performed by: PHYSICIAN ASSISTANT

## 2025-04-07 PROCEDURE — 80061 LIPID PANEL: CPT | Performed by: PHYSICIAN ASSISTANT

## 2025-04-07 PROCEDURE — 1126F AMNT PAIN NOTED NONE PRSNT: CPT | Performed by: PHYSICIAN ASSISTANT

## 2025-04-07 PROCEDURE — 83036 HEMOGLOBIN GLYCOSYLATED A1C: CPT | Performed by: PHYSICIAN ASSISTANT

## 2025-04-07 PROCEDURE — 99396 PREV VISIT EST AGE 40-64: CPT | Mod: 25 | Performed by: PHYSICIAN ASSISTANT

## 2025-04-07 RX ORDER — HYDROCHLOROTHIAZIDE 25 MG/1
25 TABLET ORAL DAILY
Qty: 90 TABLET | Refills: 3 | Status: SHIPPED | OUTPATIENT
Start: 2025-04-07

## 2025-04-07 RX ORDER — LISINOPRIL 40 MG/1
40 TABLET ORAL DAILY
Qty: 90 TABLET | Refills: 3 | Status: SHIPPED | OUTPATIENT
Start: 2025-04-07

## 2025-04-07 SDOH — HEALTH STABILITY: PHYSICAL HEALTH: ON AVERAGE, HOW MANY MINUTES DO YOU ENGAGE IN EXERCISE AT THIS LEVEL?: 20 MIN

## 2025-04-07 SDOH — HEALTH STABILITY: PHYSICAL HEALTH: ON AVERAGE, HOW MANY DAYS PER WEEK DO YOU ENGAGE IN MODERATE TO STRENUOUS EXERCISE (LIKE A BRISK WALK)?: 6 DAYS

## 2025-04-07 ASSESSMENT — SOCIAL DETERMINANTS OF HEALTH (SDOH): HOW OFTEN DO YOU GET TOGETHER WITH FRIENDS OR RELATIVES?: MORE THAN THREE TIMES A WEEK

## 2025-04-07 ASSESSMENT — PAIN SCALES - GENERAL: PAINLEVEL_OUTOF10: NO PAIN (0)

## 2025-04-07 NOTE — PROGRESS NOTES
Preventive Care Visit  [facility] GARRETT Mccoy PA-C, Family Medicine  Apr 7, 2025      Assessment & Plan     Routine general medical examination at a health care facility  Recommend routine annual visits.  Cancer screening will be updated as below.  Colonoscopy is up-to-date    Essential hypertension  Plan to recheck blood pressure again in 2 weeks with nurse will adjust medication as needed weight loss would substantially improve his hypertensive control  - hydrochlorothiazide (HYDRODIURIL) 25 MG tablet; Take 1 tablet (25 mg) by mouth daily.  - lisinopril (ZESTRIL) 40 MG tablet; Take 1 tablet (40 mg) by mouth daily.  - Comprehensive metabolic panel (BMP + Alb, Alk Phos, ALT, AST, Total. Bili, TP); Future    Body mass index (BMI) 45.0-49.9, adult (H)    Discussed at length with the use of GLP-1 injections for weight loss.  We discussed patient alerts regarding this medication including increasing depression and suicidal ideation, risk of thyroid tumors, and the fact that patients with a family history of medullary thyroid cancer or multiple endocrine neoplasia syndrome should not use these medications.  We also discussed that pancreatitis may occur,  patients with a history of pancreatitis should avoid its use or at least use with extreme caution.  We discussed some potential side effects to be nausea, vomiting, and constipation due its mechanism of action.  If dehydration occurs because of this medication acute kidney injury could occur.  There is a risk for pancreatitis as above, people with elevated triglycerides  should use this medication with caution as well.  There is a risk for the development of gallbladder disease and of course low blood sugars.  We would start with a lower dosage of the first month and then increase the dosage to efficacy from there.  There is some concerns with muscle wasting with this medication and a reduction in bone density, it is important to eat a diet rich  "in lean proteins, do weightbearing exercise on a regular basis, and lifts weights for taking his medication.   - tirzepatide-Weight Management (ZEPBOUND) 2.5 MG/0.5ML prefilled pen; Inject 0.5 mLs (2.5 mg) subcutaneously every 7 days.    AILEEN (obstructive sleep apnea)  he is currently untreated in regards to his sleep apnea because he cannot tolerate a mask of any kind.  We will prescribe tirzepatide as that is  indicated for sleep apnea treatment.  Weight loss would markedly improve his sleep apnea.  Treatment of sleep apnea will also improve his blood pressure control.    - tirzepatide-Weight Management (ZEPBOUND) 2.5 MG/0.5ML prefilled pen; Inject 0.5 mLs (2.5 mg) subcutaneously every 7 days.    Screening for diabetes mellitus    - Hemoglobin A1c; Future    CARDIOVASCULAR SCREENING; LDL GOAL LESS THAN 160    - Lipid panel reflex to direct LDL Fasting; Future    Patient has been advised of split billing requirements and indicates understanding: Yes        BMI  Estimated body mass index is 44.38 kg/m  as calculated from the following:    Height as of this encounter: 1.727 m (5' 8\").    Weight as of this encounter: 132.4 kg (291 lb 14.4 oz).   Weight management plan: Discussed healthy diet and exercise guidelines    Counseling  Appropriate preventive services were addressed with this patient via screening, questionnaire, or discussion as appropriate for fall prevention, nutrition, physical activity, Tobacco-use cessation, social engagement, weight loss and cognition.  Checklist reviewing preventive services available has been given to the patient.  Reviewed patient's diet, addressing concerns and/or questions.   The patient was instructed to see the dentist every 6 months.       This chart documentation was completed in part with Dragon voice recognition software.  Documentation is reviewed after completion, however, some words and grammatical errors may remain.  YUNIER Estrada is a 49 " year old, presenting for the following:  Physical        4/7/2025     8:20 AM   Additional Questions   Roomed by NICO   Accompanied by self          HPI  He is interested in medications to help him lose weight.  He does have the diagnosis of sleep apnea but cannot tolerate any mask at all and knows weight loss would help to improve his sleep apnea.  He has lost some weight since changing jobs he is not snacking as much and is lost about 10 pounds since January.  He would like to lose a significant amount more.     Hypertension Follow-up    Do you check your blood pressure regularly outside of the clinic? Yes   Are you following a low salt diet? Yes  Are your blood pressures ever more than 140 on the top number (systolic) OR more   than 90 on the bottom number (diastolic), for example 140/90? Unsure    BP Readings from Last 2 Encounters:   04/07/25 (!) 142/94   12/13/24 112/84       Advance Care Planning  Patient does not have a Health Care Directive:       4/7/2025   General Health   How would you rate your overall physical health? Good   Feel stress (tense, anxious, or unable to sleep) Not at all         4/7/2025   Nutrition   Three or more servings of calcium each day? (!) NO   Diet: I don't know   How many servings of fruit and vegetables per day? (!) 0-1, goal is 5   How many sweetened beverages each day? 0-1         4/7/2025   Exercise   Days per week of moderate/strenous exercise 6 days   Average minutes spent exercising at this level 20 min         4/7/2025   Social Factors   Frequency of gathering with friends or relatives More than three times a week   Worry food won't last until get money to buy more No   Food not last or not have enough money for food? No   Do you have housing? (Housing is defined as stable permanent housing and does not include staying ouside in a car, in a tent, in an abandoned building, in an overnight shelter, or couch-surfing.) Yes   Are you worried about losing your housing? No   Lack  of transportation? No   Unable to get utilities (heat,electricity)? No         4/7/2025   Dental   Dentist two times every year? (!) NO           2/26/2024   TB Screening   Were you born outside of the [place]? (!) NO- initially answered incorrectly           Today's PHQ-2 Score:       4/7/2025     8:08 AM   PHQ-2 ( 1999 [employer])   Q1: Little interest or pleasure in doing things 0   Q2: Feeling down, depressed or hopeless 0   PHQ-2 Score 0    Q1: Little interest or pleasure in doing things Not at all   Q2: Feeling down, depressed or hopeless Not at all   PHQ-2 Score 0       Patient-reported           4/7/2025   Substance Use   Alcohol more than 3/day or more than 7/wk No   Do you use any other substances recreationally? No     Social History     Tobacco Use    Smoking status: Never     Passive exposure: Never    Smokeless tobacco: Never   Vaping Use    Vaping status: Never Used   Substance Use Topics    Alcohol use: Yes     Comment: 1-2 times/month    Drug use: No           4/7/2025   STI Screening   New sexual partner(s) since last STI/HIV test? No   ASCVD Risk   The 10-year ASCVD risk score (Gerard HOBBS, et al., 2019) is: 2.6%    Values used to calculate the score:      Age: 49 years      Sex: Male      Is Non- : No      Diabetic: No      Tobacco smoker: No      Systolic Blood Pressure: 142 mmHg      Is BP treated: Yes      HDL Cholesterol: 40 mg/dL      Total Cholesterol: 140 mg/dL        4/7/2025   Contraception/Family Planning   Questions about contraception or family planning No        Reviewed and updated as needed this visit by Provider                    Lab work is in process  Labs reviewed in EPIC  BP Readings from Last 3 Encounters:   04/07/25 (!) 142/94   12/13/24 112/84   04/04/24 113/64    Wt Readings from Last 3 Encounters:   04/07/25 132.4 kg (291 lb 14.4 oz)   02/26/24 135.9 kg (299 lb 8 oz)   03/27/23 134.7 kg (296 lb 14.4 oz)                  Patient Active Problem List  "  Diagnosis    CARDIOVASCULAR SCREENING; LDL GOAL LESS THAN 160    Diverticulosis of colon- has had a few flares of \"-itis\"    Renal colic    Morbid obesity (H)    Essential hypertension    BMI 40.0-44.9, adult (H)     Past Surgical History:   Procedure Laterality Date    COLONOSCOPY N/A 4/4/2024    Procedure: Colonoscopy;  Surgeon: Iván Ayon DO;  Location: [facility]    NO HISTORY OF SURGERY         Social History     Tobacco Use    Smoking status: Never     Passive exposure: Never    Smokeless tobacco: Never   Substance Use Topics    Alcohol use: Yes     Comment: 1-2 times/month     Family History   Problem Relation Age of Onset    Hypertension Father     Diabetes Maternal Grandmother     Diabetes Maternal Grandfather     Asthma No family hx of     C.A.D. No family hx of     Cerebrovascular Disease No family hx of     Breast Cancer No family hx of     Cancer - colorectal No family hx of     Prostate Cancer No family hx of     Alcohol/Drug No family hx of     Allergies No family hx of     Alzheimer Disease No family hx of     Anesthesia Reaction No family hx of     Arthritis No family hx of     Blood Disease No family hx of     Cancer No family hx of     Cardiovascular No family hx of     Circulatory No family hx of     Connective Tissue Disorder No family hx of     Congenital Anomalies No family hx of     Depression No family hx of     Eye Disorder No family hx of     Genetic Disorder No family hx of     Gastrointestinal Disease No family hx of     Genitourinary Problems No family hx of     Gynecology No family hx of     Heart Disease No family hx of     Lipids No family hx of     Musculoskeletal Disorder No family hx of     Neurologic Disorder No family hx of     Obesity No family hx of     Osteoporosis No family hx of     Psychotic Disorder No family hx of     Respiratory No family hx of     Thyroid Disease No family hx of     Hearing Loss No family hx of          Current Outpatient Medications " "  Medication Sig Dispense Refill    hydrochlorothiazide (HYDRODIURIL) 25 MG tablet Take 1 tablet (25 mg) by mouth daily. 90 tablet 3    lisinopril (ZESTRIL) 40 MG tablet Take 1 tablet (40 mg) by mouth daily. 90 tablet 3    tirzepatide-Weight Management (ZEPBOUND) 2.5 MG/0.5ML prefilled pen Inject 0.5 mLs (2.5 mg) subcutaneously every 7 days. 2 mL 0     Allergies   Allergen Reactions    No Known Allergies      Recent Labs   Lab Test 04/07/25  1014 02/26/24  1413 04/17/23  1421 03/27/23  1201 01/02/23  0753 01/02/23  0753 10/29/20  1059 11/17/19  2158 10/21/19  0942 10/21/19  0942   A1C 6.1* 6.1*  --   --   --  6.3*  --   --   --   --    LDL  --  82  --  85  --   --  75  --   --  105*   HDL  --  40  --  46  --   --  43  --   --  48   TRIG  --  91  --  78  --   --  64  --   --  88   ALT  --  57  --   --   --  50 84* 88*   < >  --    CR  --  0.71  --   --   --  1.06 0.81 0.82  --  0.81   GFRESTIMATED  --  >90  --   --   --  88 >90 >90  --  >90   GFRESTBLACK  --   --   --   --   --   --  >90 >90  --  >90   POTASSIUM  --  3.6 3.7 3.8   < > 4.2 4.3 3.9  --  3.6   TSH  --   --   --   --   --   --   --   --   --  1.22    < > = values in this interval not displayed.          Review of Systems  Constitutional, HEENT, cardiovascular, pulmonary, gi and gu systems are negative, except as otherwise noted.     Objective    Exam  BP (!) 142/94   Pulse 76   Temp 98.3  F (36.8  C) (Temporal)   Resp 19   Ht 1.727 m (5' 8\")   Wt 132.4 kg (291 lb 14.4 oz)   SpO2 96%   BMI 44.38 kg/m     Estimated body mass index is 44.38 kg/m  as calculated from the following:    Height as of this encounter: 1.727 m (5' 8\").    Weight as of this encounter: 132.4 kg (291 lb 14.4 oz).    Physical Exam  GENERAL: alert and no distress  EYES: Eyes grossly normal to inspection, PERRL and conjunctivae and sclerae normal  HENT: ear canals and TM's normal, nose and mouth without ulcers or lesions  NECK: no adenopathy, no asymmetry, masses, or scars  RESP: " lungs clear to auscultation - no rales, rhonchi or wheezes  CV: regular rate and rhythm, normal S1 S2, no S3 or S4, no murmur, click or rub, no peripheral edema  ABDOMEN: soft, nontender, no hepatosplenomegaly, no masses and bowel sounds normal  MS: no gross musculoskeletal defects noted, no edema  SKIN: no suspicious lesions or rashes  NEURO: Normal strength and tone, mentation intact and speech normal  PSYCH: mentation appears normal, affect normal/bright        Signed Electronically by: Es Mccoy PA-C

## 2025-04-07 NOTE — PATIENT INSTRUCTIONS
Patient Education   Preventive Care Advice   This is general advice given by our system to help you stay healthy. However, your care team may have specific advice just for you. Please talk to your care team about your preventive care needs.  Nutrition  Eat 5 or more servings of fruits and vegetables each day.  Try wheat bread, brown rice and whole grain pasta (instead of white bread, rice, and pasta).  Get enough calcium and vitamin D. Check the label on foods and aim for 100% of the RDA (recommended daily allowance).  Lifestyle  Exercise at least 150 minutes each week  (30 minutes a day, 5 days a week).  Do muscle strengthening activities 2 days a week. These help control your weight and prevent disease.  No smoking.  Wear sunscreen to prevent skin cancer.  Have a dental exam and cleaning every 6 months.  Yearly exams  See your health care team every year to talk about:  Any changes in your health.  Any medicines your care team has prescribed.  Preventive care, family planning, and ways to prevent chronic diseases.  Shots (vaccines)   HPV shots (up to age 26), if you've never had them before.  Hepatitis B shots (up to age 59), if you've never had them before.  COVID-19 shot: Get this shot when it's due.  Flu shot: Get a flu shot every year.  Tetanus shot: Get a tetanus shot every 10 years.  Pneumococcal, hepatitis A, and RSV shots: Ask your care team if you need these based on your risk.  Shingles shot (for age 50 and up)  General health tests  Diabetes screening:  Starting at age 35, Get screened for diabetes at least every 3 years.  If you are younger than age 35, ask your care team if you should be screened for diabetes.  Cholesterol test: At age 39, start having a cholesterol test every 5 years, or more often if advised.  Bone density scan (DEXA): At age 50, ask your care team if you should have this scan for osteoporosis (brittle bones).  Hepatitis C: Get tested at least once in your life.  STIs (sexually  transmitted infections)  Before age 24: Ask your care team if you should be screened for STIs.  After age 24: Get screened for STIs if you're at risk. You are at risk for STIs (including HIV) if:  You are sexually active with more than one person.  You don't use condoms every time.  You or a partner was diagnosed with a sexually transmitted infection.  If you are at risk for HIV, ask about PrEP medicine to prevent HIV.  Get tested for HIV at least once in your life, whether you are at risk for HIV or not.  Cancer screening tests  Cervical cancer screening: If you have a cervix, begin getting regular cervical cancer screening tests starting at age 21.  Breast cancer scan (mammogram): If you've ever had breasts, begin having regular mammograms starting at age 40. This is a scan to check for breast cancer.  Colon cancer screening: It is important to start screening for colon cancer at age 45.  Have a colonoscopy test every 10 years (or more often if you're at risk) Or, ask your provider about stool tests like a FIT test every year or Cologuard test every 3 years.  To learn more about your testing options, visit:   .  For help making a decision, visit:   https://bit.ly/jx05972.  Prostate cancer screening test: If you have a prostate, ask your care team if a prostate cancer screening test (PSA) at age 55 is right for you.  Lung cancer screening: If you are a current or former smoker ages 50 to 80, ask your care team if ongoing lung cancer screenings are right for you.  For informational purposes only. Not to replace the advice of your health care provider. Copyright   2023 [employer]. All rights reserved. Clinically reviewed by the [facility] Transitions Program. [employer] [id number] - REV 01/24.

## 2025-04-08 ENCOUNTER — MYC MEDICAL ADVICE (OUTPATIENT)
Dept: FAMILY MEDICINE | Facility: CLINIC | Age: 50
End: 2025-04-08
Payer: COMMERCIAL

## 2025-04-08 NOTE — TELEPHONE ENCOUNTER
Patient Quality Outreach    Patient is due for the following:   Hypertension -  BP check    Action(s) Taken:   Schedule a nurse only visit for BP Check in 2 weeks, per PCP visit on 4/7/25    Type of outreach:    Sent [employer] message.    Questions for provider review:    None         Peyton Romero MA  Chart routed to Care Team.

## 2025-04-15 NOTE — TELEPHONE ENCOUNTER
Patient Quality Outreach 2nd Attempt      Summary:  Patient is due for the following:   Hypertension -  BP check     Action(s) Taken:   Schedule a nurse only visit for BP Check in 2 weeks, per PCP visit on 4/7/25    Type of outreach:    Phone, spoke to patient/parent. Patient is scheduled at [place] for BP check. MA schedule was closed due to staffing so was added to RN schedule on 4/21.    Next Steps:  Reach out within 90 days via [employer].    Max number of attempts reached: No. Will try again in 90 days if patient still on fail list.    Questions for provider review:    None                                                                                                                    Gina Mendoza CMA (AAMA)       Chart routed to None.

## 2025-04-21 ENCOUNTER — ALLIED HEALTH/NURSE VISIT (OUTPATIENT)
Dept: FAMILY MEDICINE | Facility: CLINIC | Age: 50
End: 2025-04-21
Payer: COMMERCIAL

## 2025-04-21 VITALS — SYSTOLIC BLOOD PRESSURE: 126 MMHG | DIASTOLIC BLOOD PRESSURE: 70 MMHG | HEART RATE: 72 BPM

## 2025-04-21 DIAGNOSIS — I10 ESSENTIAL HYPERTENSION: Primary | ICD-10-CM

## 2025-04-21 NOTE — PROGRESS NOTES
I met with Alexey Underwood at the request of Es Mccoy PA-C to recheck his blood pressure.  Blood pressure medications on the med list were reviewed with patient.    Patient has taken all medications as per usual regimen: Yes  Patient reports tolerating them without any issues or concerns: Yes    Vitals:    04/21/25 1007   BP: 126/70   BP Location: Left arm   Patient Position: Sitting   Cuff Size: Adult Large   Pulse: 72       Blood pressure was taken, previous encounter was reviewed, recorded blood pressure below 140/90.  Patient was discharged and the note will be sent to the provider for final review.    Patient does not check blood pressure at home.    Kristine Jacobs RN on 4/21/2025 at 10:12 AM

## 2025-05-02 ENCOUNTER — TELEPHONE (OUTPATIENT)
Dept: FAMILY MEDICINE | Facility: CLINIC | Age: 50
End: 2025-05-02
Payer: COMMERCIAL

## 2025-05-02 DIAGNOSIS — E66.01 MORBID OBESITY (H): Primary | ICD-10-CM

## 2025-05-02 DIAGNOSIS — G47.33 OSA (OBSTRUCTIVE SLEEP APNEA): ICD-10-CM

## 2025-05-02 NOTE — TELEPHONE ENCOUNTER
FYI - Status Update    Who is Calling: patient    Update: Pt calling 3 weeks in on weight loss shot to update how it is going. Alexey reports that it is going really well (and not experiencing side effects) and he would like to continue use.    Does caller want a call/response back: No, unless provider has any questions

## 2025-05-05 ENCOUNTER — MYC MEDICAL ADVICE (OUTPATIENT)
Dept: FAMILY MEDICINE | Facility: CLINIC | Age: 50
End: 2025-05-05
Payer: COMMERCIAL

## 2025-05-05 NOTE — TELEPHONE ENCOUNTER
Mychart message sent--in case he calls back instead of responding to my MyChart is he losing weight?  Does he want to increase dosage?  Es Mccoy PA-C

## 2025-06-03 DIAGNOSIS — G47.33 OSA (OBSTRUCTIVE SLEEP APNEA): ICD-10-CM

## 2025-06-03 DIAGNOSIS — E66.01 MORBID OBESITY (H): ICD-10-CM

## 2025-06-03 NOTE — TELEPHONE ENCOUNTER
Patient states everything going well    Refill same dose     Patient is 253 today    Does he need dose increase yet?    Call to advise  [phone]  Ok to leave message

## 2025-06-03 NOTE — TELEPHONE ENCOUNTER
Called patient and relayed message below. Patient stated he feels great and is able to walk further and increase activity. Continuing to lose weight currently. Will reach out with changes or plateau.    Patient  verbalized understanding and all questions answered.     Miya Jolly RN  [facility] - Registered Nurse  Clinic Triage [place]   Christina 3, 2025

## 2025-06-03 NOTE — TELEPHONE ENCOUNTER
Please call patient, as long as he continues to lose weight and is tolerating the dosage we do not need to increase the dosage.  When he plateaus and does not further lose weight then we increased the dosage.  Is he still losing weight?  .nppan

## 2025-06-04 RX ORDER — TIRZEPATIDE 5 MG/.5ML
INJECTION, SOLUTION SUBCUTANEOUS
Qty: 2 ML | Refills: 2 | Status: SHIPPED | OUTPATIENT
Start: 2025-06-04

## 2025-06-30 ENCOUNTER — MYC MEDICAL ADVICE (OUTPATIENT)
Dept: FAMILY MEDICINE | Facility: CLINIC | Age: 50
End: 2025-06-30
Payer: COMMERCIAL

## 2025-06-30 DIAGNOSIS — G47.33 OSA (OBSTRUCTIVE SLEEP APNEA): ICD-10-CM

## 2025-06-30 DIAGNOSIS — E66.01 MORBID OBESITY (H): ICD-10-CM

## 2025-07-28 ENCOUNTER — MYC MEDICAL ADVICE (OUTPATIENT)
Dept: FAMILY MEDICINE | Facility: CLINIC | Age: 50
End: 2025-07-28
Payer: COMMERCIAL

## 2025-07-28 ENCOUNTER — MYC REFILL (OUTPATIENT)
Dept: FAMILY MEDICINE | Facility: CLINIC | Age: 50
End: 2025-07-28
Payer: COMMERCIAL

## 2025-07-28 DIAGNOSIS — G47.33 OSA (OBSTRUCTIVE SLEEP APNEA): ICD-10-CM

## 2025-07-28 DIAGNOSIS — E66.01 MORBID OBESITY (H): ICD-10-CM

## 2025-07-28 NOTE — TELEPHONE ENCOUNTER
Current dose is Zepbound 5 mg. Does provider want to increase at this time?    Camilla Monte BSN, RN

## 2025-08-04 ENCOUNTER — OFFICE VISIT (OUTPATIENT)
Dept: FAMILY MEDICINE | Facility: CLINIC | Age: 50
End: 2025-08-04
Payer: COMMERCIAL

## 2025-08-04 VITALS
DIASTOLIC BLOOD PRESSURE: 82 MMHG | HEIGHT: 68 IN | HEART RATE: 64 BPM | BODY MASS INDEX: 37.44 KG/M2 | SYSTOLIC BLOOD PRESSURE: 125 MMHG | WEIGHT: 247 LBS | OXYGEN SATURATION: 96 % | RESPIRATION RATE: 16 BRPM | TEMPERATURE: 97.4 F

## 2025-08-04 DIAGNOSIS — E66.01 CLASS 2 SEVERE OBESITY DUE TO EXCESS CALORIES WITH SERIOUS COMORBIDITY AND BODY MASS INDEX (BMI) OF 37.0 TO 37.9 IN ADULT (H): Primary | ICD-10-CM

## 2025-08-04 DIAGNOSIS — I10 ESSENTIAL HYPERTENSION: ICD-10-CM

## 2025-08-04 DIAGNOSIS — E66.812 CLASS 2 SEVERE OBESITY DUE TO EXCESS CALORIES WITH SERIOUS COMORBIDITY AND BODY MASS INDEX (BMI) OF 37.0 TO 37.9 IN ADULT (H): Primary | ICD-10-CM

## 2025-08-04 LAB
ANION GAP SERPL CALCULATED.3IONS-SCNC: 12 MMOL/L (ref 7–15)
BUN SERPL-MCNC: 19 MG/DL (ref 6–20)
CALCIUM SERPL-MCNC: 9.5 MG/DL (ref 8.8–10.4)
CHLORIDE SERPL-SCNC: 102 MMOL/L (ref 98–107)
CREAT SERPL-MCNC: 0.85 MG/DL (ref 0.51–1.17)
EGFRCR SERPLBLD CKD-EPI 2021: >90 ML/MIN/1.73M2
GLUCOSE SERPL-MCNC: 92 MG/DL (ref 70–99)
HCO3 SERPL-SCNC: 25 MMOL/L (ref 22–29)
POTASSIUM SERPL-SCNC: 3.8 MMOL/L (ref 3.4–5.3)
SODIUM SERPL-SCNC: 139 MMOL/L (ref 135–145)

## 2025-08-04 PROCEDURE — 36415 COLL VENOUS BLD VENIPUNCTURE: CPT | Performed by: PHYSICIAN ASSISTANT

## 2025-08-04 PROCEDURE — 3074F SYST BP LT 130 MM HG: CPT | Performed by: PHYSICIAN ASSISTANT

## 2025-08-04 PROCEDURE — 3079F DIAST BP 80-89 MM HG: CPT | Performed by: PHYSICIAN ASSISTANT

## 2025-08-04 PROCEDURE — 1126F AMNT PAIN NOTED NONE PRSNT: CPT | Performed by: PHYSICIAN ASSISTANT

## 2025-08-04 PROCEDURE — 80048 BASIC METABOLIC PNL TOTAL CA: CPT | Performed by: PHYSICIAN ASSISTANT

## 2025-08-04 PROCEDURE — 99213 OFFICE O/P EST LOW 20 MIN: CPT | Performed by: PHYSICIAN ASSISTANT

## 2025-08-04 ASSESSMENT — PAIN SCALES - GENERAL: PAINLEVEL_OUTOF10: NO PAIN (0)

## 2025-08-22 ENCOUNTER — MYC MEDICAL ADVICE (OUTPATIENT)
Dept: FAMILY MEDICINE | Facility: CLINIC | Age: 50
End: 2025-08-22
Payer: COMMERCIAL

## 2025-08-22 DIAGNOSIS — E66.812 CLASS 2 SEVERE OBESITY DUE TO EXCESS CALORIES WITH SERIOUS COMORBIDITY AND BODY MASS INDEX (BMI) OF 37.0 TO 37.9 IN ADULT (H): ICD-10-CM

## 2025-08-22 DIAGNOSIS — E66.01 CLASS 2 SEVERE OBESITY DUE TO EXCESS CALORIES WITH SERIOUS COMORBIDITY AND BODY MASS INDEX (BMI) OF 37.0 TO 37.9 IN ADULT (H): ICD-10-CM

## (undated) DEVICE — GLOVE EXAM NITRILE LG

## (undated) DEVICE — KIT ENDO TURNOVER/PROCEDURE CARRY-ON 101822

## (undated) DEVICE — TUBING SUCTION 12"X1/4" N612

## (undated) DEVICE — SOL WATER IRRIG 1000ML BOTTLE 07139-09

## (undated) DEVICE — LUBRICATING JELLY 4.25OZ